# Patient Record
Sex: FEMALE | Race: WHITE | NOT HISPANIC OR LATINO | URBAN - METROPOLITAN AREA
[De-identification: names, ages, dates, MRNs, and addresses within clinical notes are randomized per-mention and may not be internally consistent; named-entity substitution may affect disease eponyms.]

---

## 2018-01-22 ENCOUNTER — EMERGENCY (EMERGENCY)
Facility: HOSPITAL | Age: 38
LOS: 1 days | Discharge: HOME | End: 2018-01-22
Attending: EMERGENCY MEDICINE | Admitting: EMERGENCY MEDICINE
Payer: COMMERCIAL

## 2018-01-22 VITALS
RESPIRATION RATE: 16 BRPM | SYSTOLIC BLOOD PRESSURE: 105 MMHG | HEART RATE: 66 BPM | DIASTOLIC BLOOD PRESSURE: 54 MMHG | TEMPERATURE: 99 F | OXYGEN SATURATION: 98 %

## 2018-01-22 VITALS
SYSTOLIC BLOOD PRESSURE: 109 MMHG | DIASTOLIC BLOOD PRESSURE: 62 MMHG | RESPIRATION RATE: 16 BRPM | TEMPERATURE: 99 F | HEART RATE: 78 BPM | OXYGEN SATURATION: 100 %

## 2018-01-22 DIAGNOSIS — Z98.89 OTHER SPECIFIED POSTPROCEDURAL STATES: Chronic | ICD-10-CM

## 2018-01-22 LAB
ALBUMIN SERPL ELPH-MCNC: 4.4 G/DL — SIGNIFICANT CHANGE UP (ref 3.3–5)
ALP SERPL-CCNC: 42 U/L — SIGNIFICANT CHANGE UP (ref 40–120)
ALT FLD-CCNC: 10 U/L — SIGNIFICANT CHANGE UP (ref 4–33)
APPEARANCE UR: CLEAR — SIGNIFICANT CHANGE UP
AST SERPL-CCNC: 15 U/L — SIGNIFICANT CHANGE UP (ref 4–32)
BACTERIA # UR AUTO: SIGNIFICANT CHANGE UP
BASOPHILS # BLD AUTO: 0.04 K/UL — SIGNIFICANT CHANGE UP (ref 0–0.2)
BASOPHILS NFR BLD AUTO: 0.5 % — SIGNIFICANT CHANGE UP (ref 0–2)
BILIRUB SERPL-MCNC: 0.3 MG/DL — SIGNIFICANT CHANGE UP (ref 0.2–1.2)
BILIRUB UR-MCNC: NEGATIVE — SIGNIFICANT CHANGE UP
BLOOD UR QL VISUAL: NEGATIVE — SIGNIFICANT CHANGE UP
BUN SERPL-MCNC: 10 MG/DL — SIGNIFICANT CHANGE UP (ref 7–23)
CALCIUM SERPL-MCNC: 8.8 MG/DL — SIGNIFICANT CHANGE UP (ref 8.4–10.5)
CHLORIDE SERPL-SCNC: 103 MMOL/L — SIGNIFICANT CHANGE UP (ref 98–107)
CO2 SERPL-SCNC: 21 MMOL/L — LOW (ref 22–31)
COLOR SPEC: SIGNIFICANT CHANGE UP
CREAT SERPL-MCNC: 0.71 MG/DL — SIGNIFICANT CHANGE UP (ref 0.5–1.3)
EOSINOPHIL # BLD AUTO: 0.07 K/UL — SIGNIFICANT CHANGE UP (ref 0–0.5)
EOSINOPHIL NFR BLD AUTO: 0.9 % — SIGNIFICANT CHANGE UP (ref 0–6)
GLUCOSE SERPL-MCNC: 104 MG/DL — HIGH (ref 70–99)
GLUCOSE UR-MCNC: NEGATIVE — SIGNIFICANT CHANGE UP
HCT VFR BLD CALC: 38.5 % — SIGNIFICANT CHANGE UP (ref 34.5–45)
HGB BLD-MCNC: 13.3 G/DL — SIGNIFICANT CHANGE UP (ref 11.5–15.5)
IMM GRANULOCYTES # BLD AUTO: 0.03 # — SIGNIFICANT CHANGE UP
IMM GRANULOCYTES NFR BLD AUTO: 0.4 % — SIGNIFICANT CHANGE UP (ref 0–1.5)
KETONES UR-MCNC: NEGATIVE — SIGNIFICANT CHANGE UP
LEUKOCYTE ESTERASE UR-ACNC: HIGH
LYMPHOCYTES # BLD AUTO: 2.65 K/UL — SIGNIFICANT CHANGE UP (ref 1–3.3)
LYMPHOCYTES # BLD AUTO: 33.5 % — SIGNIFICANT CHANGE UP (ref 13–44)
MCHC RBC-ENTMCNC: 31.4 PG — SIGNIFICANT CHANGE UP (ref 27–34)
MCHC RBC-ENTMCNC: 34.5 % — SIGNIFICANT CHANGE UP (ref 32–36)
MCV RBC AUTO: 90.8 FL — SIGNIFICANT CHANGE UP (ref 80–100)
MONOCYTES # BLD AUTO: 0.52 K/UL — SIGNIFICANT CHANGE UP (ref 0–0.9)
MONOCYTES NFR BLD AUTO: 6.6 % — SIGNIFICANT CHANGE UP (ref 2–14)
NEUTROPHILS # BLD AUTO: 4.61 K/UL — SIGNIFICANT CHANGE UP (ref 1.8–7.4)
NEUTROPHILS NFR BLD AUTO: 58.1 % — SIGNIFICANT CHANGE UP (ref 43–77)
NITRITE UR-MCNC: NEGATIVE — SIGNIFICANT CHANGE UP
NRBC # FLD: 0 — SIGNIFICANT CHANGE UP
PH UR: 7.5 — SIGNIFICANT CHANGE UP (ref 4.6–8)
PLATELET # BLD AUTO: 212 K/UL — SIGNIFICANT CHANGE UP (ref 150–400)
PMV BLD: 9.5 FL — SIGNIFICANT CHANGE UP (ref 7–13)
POTASSIUM SERPL-MCNC: 4 MMOL/L — SIGNIFICANT CHANGE UP (ref 3.5–5.3)
POTASSIUM SERPL-SCNC: 4 MMOL/L — SIGNIFICANT CHANGE UP (ref 3.5–5.3)
PROT SERPL-MCNC: 7 G/DL — SIGNIFICANT CHANGE UP (ref 6–8.3)
PROT UR-MCNC: NEGATIVE MG/DL — SIGNIFICANT CHANGE UP
RBC # BLD: 4.24 M/UL — SIGNIFICANT CHANGE UP (ref 3.8–5.2)
RBC # FLD: 12.3 % — SIGNIFICANT CHANGE UP (ref 10.3–14.5)
RBC CASTS # UR COMP ASSIST: SIGNIFICANT CHANGE UP (ref 0–?)
SODIUM SERPL-SCNC: 137 MMOL/L — SIGNIFICANT CHANGE UP (ref 135–145)
SP GR SPEC: 1.01 — SIGNIFICANT CHANGE UP (ref 1–1.04)
SQUAMOUS # UR AUTO: SIGNIFICANT CHANGE UP
UROBILINOGEN FLD QL: NORMAL MG/DL — SIGNIFICANT CHANGE UP
WBC # BLD: 7.92 K/UL — SIGNIFICANT CHANGE UP (ref 3.8–10.5)
WBC # FLD AUTO: 7.92 K/UL — SIGNIFICANT CHANGE UP (ref 3.8–10.5)
WBC UR QL: SIGNIFICANT CHANGE UP (ref 0–?)

## 2018-01-22 PROCEDURE — 99284 EMERGENCY DEPT VISIT MOD MDM: CPT

## 2018-01-22 PROCEDURE — 71046 X-RAY EXAM CHEST 2 VIEWS: CPT | Mod: 26

## 2018-01-22 NOTE — ED PROVIDER NOTE - CARE PLAN
Principal Discharge DX:	Shortness of breath  Assessment and plan of treatment:	The patient was given verbal and written discharge instructions. Specifically, instructions when to return to the ED and when to seek follow-up from their pcp was discussed. Any specialty follow-up was discussed, including how to make an appointment.  Instructions were discussed in simple, plain language and was understood by the patient. The patient understands that should their symptoms worsen or any new symptoms arise, they should return to the ED immediately for further evaluation.

## 2018-01-22 NOTE — ED PROVIDER NOTE - MEDICAL DECISION MAKING DETAILS
sob, perc negative, no chest pain, will r/o anemia vs. electrolyte disturbance, cxr r/o pneumo or lung mass (given smoking hx).

## 2018-01-22 NOTE — ED PROVIDER NOTE - ATTENDING CONTRIBUTION TO CARE
I performed a face to face evaluation of this patient and performed a full history and physical examination on the patient.  I agree with the resident's history, physical examination, and plan of the patient.  36yo F no significant PMH p/w sob x 5d, initially intermittent, now more frequent, currently no sob, worse with exertion occasionally. Patient went to urgent care and referred to ED for evaluation, provider suggested PE though patient does not know what prompted this concern. Patient also recently worried for pregnancy, multiple neg home pregnancy tests and suprapubic pressure without dysuria  On exam awake & alert, NAD., NC/AT, mmm, lungs CTAB, RRR, abdomen soft NT/ND no rebound no guarding, no CVA tenderness, no edema, no calf tenderness, 2+ pulses b/l, neuro A&Ox3, no focal deficits, skin warm and dry no rash

## 2018-01-22 NOTE — ED PROVIDER NOTE - OBJECTIVE STATEMENT
37yF no pmhx p/w sob x 5d, initially intermittent, now constant, not worse with exertion or position. pt notes it most after lunch when walking back to work. No recent cough, fever, chest pain. Pt also has suprapubic cramping pain x 2wks. No dysuria. Has identical symptoms when pregnant. Pt was convinced she was pregnant. Tearful on exam (after being informed she is not pregnant).

## 2018-01-22 NOTE — ED ADULT TRIAGE NOTE - CHIEF COMPLAINT QUOTE
Pt complaining of SOB x5 days worsening, respirations even and unlabored, able to speak in complete sentences. Pt denies chest pain, N/V/D, fever or chills.

## 2019-09-11 ENCOUNTER — RESULT REVIEW (OUTPATIENT)
Age: 39
End: 2019-09-11

## 2019-10-24 ENCOUNTER — APPOINTMENT (OUTPATIENT)
Dept: ANTEPARTUM | Facility: CLINIC | Age: 39
End: 2019-10-24
Payer: COMMERCIAL

## 2019-10-24 ENCOUNTER — ASOB RESULT (OUTPATIENT)
Age: 39
End: 2019-10-24

## 2019-10-24 PROCEDURE — 76813 OB US NUCHAL MEAS 1 GEST: CPT

## 2019-10-24 PROCEDURE — 99241 OFFICE CONSULTATION NEW/ESTAB PATIENT 15 MIN: CPT | Mod: 25

## 2019-10-24 PROCEDURE — 36416 COLLJ CAPILLARY BLOOD SPEC: CPT

## 2019-10-24 PROCEDURE — 36415 COLL VENOUS BLD VENIPUNCTURE: CPT

## 2019-10-24 PROCEDURE — 76801 OB US < 14 WKS SINGLE FETUS: CPT

## 2019-12-23 ENCOUNTER — APPOINTMENT (OUTPATIENT)
Dept: ANTEPARTUM | Facility: CLINIC | Age: 39
End: 2019-12-23
Payer: COMMERCIAL

## 2019-12-23 ENCOUNTER — ASOB RESULT (OUTPATIENT)
Age: 39
End: 2019-12-23

## 2019-12-23 PROCEDURE — 76811 OB US DETAILED SNGL FETUS: CPT

## 2020-01-09 ENCOUNTER — OUTPATIENT (OUTPATIENT)
Dept: INPATIENT UNIT | Facility: HOSPITAL | Age: 40
LOS: 1 days | Discharge: HOME | End: 2020-01-09
Payer: COMMERCIAL

## 2020-01-09 VITALS — DIASTOLIC BLOOD PRESSURE: 49 MMHG | SYSTOLIC BLOOD PRESSURE: 87 MMHG | HEART RATE: 64 BPM

## 2020-01-09 VITALS
HEART RATE: 67 BPM | SYSTOLIC BLOOD PRESSURE: 102 MMHG | DIASTOLIC BLOOD PRESSURE: 53 MMHG | TEMPERATURE: 98 F | RESPIRATION RATE: 14 BRPM

## 2020-01-09 DIAGNOSIS — Z3A.00 WEEKS OF GESTATION OF PREGNANCY NOT SPECIFIED: ICD-10-CM

## 2020-01-09 DIAGNOSIS — Z98.89 OTHER SPECIFIED POSTPROCEDURAL STATES: Chronic | ICD-10-CM

## 2020-01-09 DIAGNOSIS — G56.01 CARPAL TUNNEL SYNDROME, RIGHT UPPER LIMB: Chronic | ICD-10-CM

## 2020-01-09 DIAGNOSIS — O26.899 OTHER SPECIFIED PREGNANCY RELATED CONDITIONS, UNSPECIFIED TRIMESTER: ICD-10-CM

## 2020-01-09 PROCEDURE — 76817 TRANSVAGINAL US OBSTETRIC: CPT | Mod: 26

## 2020-01-09 PROCEDURE — 99215 OFFICE O/P EST HI 40 MIN: CPT

## 2020-01-09 PROCEDURE — 76815 OB US LIMITED FETUS(S): CPT | Mod: 26

## 2020-01-09 PROCEDURE — 99215 OFFICE O/P EST HI 40 MIN: CPT | Mod: 25

## 2020-01-09 RX ORDER — INDOMETHACIN 50 MG
50 CAPSULE ORAL ONCE
Refills: 0 | Status: COMPLETED | OUTPATIENT
Start: 2020-01-09 | End: 2020-01-09

## 2020-01-09 RX ORDER — SODIUM CHLORIDE 9 MG/ML
500 INJECTION, SOLUTION INTRAVENOUS ONCE
Refills: 0 | Status: COMPLETED | OUTPATIENT
Start: 2020-01-09 | End: 2020-01-09

## 2020-01-09 RX ADMIN — SODIUM CHLORIDE 1500 MILLILITER(S): 9 INJECTION, SOLUTION INTRAVENOUS at 19:35

## 2020-01-09 RX ADMIN — Medication 50 MILLIGRAM(S): at 19:44

## 2020-01-09 NOTE — OB PROVIDER TRIAGE NOTE - NSHPPHYSICALEXAM_GEN_ALL_CORE
Gen: A&O x 3; appears very anxious  Vitals-BP-96/55; P-68    Abd exam-soft and nontender gravid uterus    TAS-footling breech; AAFI with MVP of 6.56; 587g; SLIUP; posterior intact placenta  SSE-approx 2 cc of brownish blood in the posterior vault that was cleaned out. Pt valsalva and no more active bleeding from os. +polyp seen emerging from os approx the size of a pea  TVS-3.15-3.24 cm no funnel/dynamic change. No blood in lower uterine segment seen on TVS

## 2020-01-09 NOTE — OB PROVIDER TRIAGE NOTE - NSOBPROVIDERNOTE_OBGYN_ALL_OB_FT
38yo  female  @ 23.0 wks SLIUP here with vaginal polyp due to cerical polyp  -no active polyp  -cervical length is long and reassuring; TAS reveals a reassuring fetal status 38yo  female  @ 23.0 wks SLIUP here with vaginal polyp due to cerical polyp  -no active polyp  -cervical length is long and reassuring; TAS reveals a reassuring fetal status  -toco with irreg ctx's. Pt was dw Dr Wood. IVH and rpt cervical length in 2 hours 40yo  female  @ 23.0 wks SLIUP here with vaginal polyp due to cerical polyp  -no active polyp  -cervical length is long and reassuring; TAS reveals a reassuring fetal status  -toco with irreg ctx's. Pt was dw Dr Wood. IVH and rpt cervical length in 2 hours. Indocin given 38yo  female  @ 23.0 wks SLIUP here with vaginal polyp due to cerical polyp  -no active polyp  -cervical length is long and reassuring; TAS reveals a reassuring fetal status  -toco with irreg ctx's. Pt was dw Dr Wood. IVH and rpt cervical length in 2 hours. Indocin given    Received report from AMARA PUGA.   Pt. received 50mg of Indocin at 1945  Dark dried blood noted on labia  Repeat CL: 3.0-3.3 no funneling or dynamic changes noted  Contractions have spaced out to one contraction an hour    Discussed findings with Dr. Wood. Pt. d/c'd home. Pt. to follow up with next OB appointment. Pt. instructed to follow pelvic rest and return to triage with contractions, LOF, or vaginal bleeding.

## 2020-01-09 NOTE — OB PROVIDER TRIAGE NOTE - HISTORY OF PRESENT ILLNESS
40yo  female  @ 23.0 wks SLIUP here co sudden onset of vaginal bleeding that started at approx 4:10pm with passage of small clot. Pt denies any ctx's or cramping. No hx LLP or previa. No recent sexual intercourse.    Pmhx-anxiety  Pshx/Arqf-s-rkysnmg; carpal tunnel release  Meds-PNV  Past hi-y-zjqmwqe for FTD  Gyn-denies 40yo  female  @ 23.0 wks SLIUP here co sudden onset of vaginal bleeding that started at approx 4:10pm with passage of small clot. Pt denies any ctx's or cramping. No hx LLP or previa. No recent sexual intercourse. Pt with occasional abd tightening.    Pmhx-anxiety  Pshx/Qqxr-g-tvbkalu; carpal tunnel release  Meds-PNV  Past mb-a-gtqizza for FTD  Gyn-denies

## 2020-01-09 NOTE — OB PROVIDER TRIAGE NOTE - ADDITIONAL INSTRUCTIONS
Discussed findings with Dr. Wood. Pt. d/c'd home. Pt. to follow up with next OB appointment. Pt. instructed to follow pelvic rest and return to triage with contractions, LOF, or vaginal bleeding.

## 2020-01-29 ENCOUNTER — OUTPATIENT (OUTPATIENT)
Dept: OUTPATIENT SERVICES | Facility: HOSPITAL | Age: 40
LOS: 1 days | Discharge: HOME | End: 2020-01-29
Payer: COMMERCIAL

## 2020-01-29 VITALS
RESPIRATION RATE: 17 BRPM | HEART RATE: 79 BPM | SYSTOLIC BLOOD PRESSURE: 104 MMHG | TEMPERATURE: 98 F | DIASTOLIC BLOOD PRESSURE: 55 MMHG

## 2020-01-29 VITALS — SYSTOLIC BLOOD PRESSURE: 119 MMHG | DIASTOLIC BLOOD PRESSURE: 57 MMHG | HEART RATE: 77 BPM

## 2020-01-29 DIAGNOSIS — Z98.89 OTHER SPECIFIED POSTPROCEDURAL STATES: Chronic | ICD-10-CM

## 2020-01-29 DIAGNOSIS — O26.899 OTHER SPECIFIED PREGNANCY RELATED CONDITIONS, UNSPECIFIED TRIMESTER: ICD-10-CM

## 2020-01-29 DIAGNOSIS — G56.01 CARPAL TUNNEL SYNDROME, RIGHT UPPER LIMB: Chronic | ICD-10-CM

## 2020-01-29 DIAGNOSIS — Z3A.00 WEEKS OF GESTATION OF PREGNANCY NOT SPECIFIED: ICD-10-CM

## 2020-01-29 LAB
APPEARANCE UR: CLEAR — SIGNIFICANT CHANGE UP
BILIRUB UR-MCNC: NEGATIVE — SIGNIFICANT CHANGE UP
BLOOD UR QL VISUAL: NEGATIVE — SIGNIFICANT CHANGE UP
COLOR SPEC: SIGNIFICANT CHANGE UP
GLUCOSE UR-MCNC: NEGATIVE — SIGNIFICANT CHANGE UP
KETONES UR-MCNC: NEGATIVE — SIGNIFICANT CHANGE UP
LEUKOCYTE ESTERASE UR-ACNC: NEGATIVE — SIGNIFICANT CHANGE UP
NITRITE UR-MCNC: NEGATIVE — SIGNIFICANT CHANGE UP
PH UR: 6.5 — SIGNIFICANT CHANGE UP (ref 5–8)
PROT UR-MCNC: 10 — SIGNIFICANT CHANGE UP
SP GR SPEC: 1.02 — SIGNIFICANT CHANGE UP (ref 1–1.04)
UROBILINOGEN FLD QL: NORMAL — SIGNIFICANT CHANGE UP

## 2020-01-29 PROCEDURE — 59025 FETAL NON-STRESS TEST: CPT | Mod: 26

## 2020-01-29 RX ORDER — ACETAMINOPHEN 500 MG
975 TABLET ORAL ONCE
Refills: 0 | Status: COMPLETED | OUTPATIENT
Start: 2020-01-29 | End: 2020-01-29

## 2020-01-29 RX ADMIN — Medication 975 MILLIGRAM(S): at 22:16

## 2020-01-29 RX ADMIN — Medication 975 MILLIGRAM(S): at 21:37
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
## 2020-01-29 NOTE — OB PROVIDER TRIAGE NOTE - ADDITIONAL INSTRUCTIONS
Follow up at office on Friday 1/31/20   Return for decreased fetal movement, loss of fluid or vaginal bleeding  Increase oral hydration  Kick counts reviewed

## 2020-01-29 NOTE — OB PROVIDER TRIAGE NOTE - NSHPLABSRESULTS_GEN_ALL_CORE
Urinalysis Basic - ( 2020 21:00 )    Color: LIGHT YELLOW / Appearance: CLEAR / S.020 / pH: 6.5  Gluc: NEGATIVE / Ketone: NEGATIVE  / Bili: NEGATIVE / Urobili: NORMAL   Blood: NEGATIVE / Protein: 10 / Nitrite: NEGATIVE   Leuk Esterase: NEGATIVE / RBC: x / WBC x   Sq Epi: x / Non Sq Epi: x / Bacteria: x

## 2020-01-29 NOTE — OB PROVIDER TRIAGE NOTE - NS_AFI_OBGYN_ALL_OB_FT
NURSING ADMISSION NOTE      Patient admitted via direct admission. Oriented to room. Safety precautions initiated. Bed in low position. Call light in reach. 20

## 2020-01-29 NOTE — OB PROVIDER TRIAGE NOTE - NSHPPHYSICALEXAM_GEN_ALL_CORE
Vital Signs Last 24 Hrs  T(C): 36.8 (29 Jan 2020 19:56), Max: 36.8 (29 Jan 2020 19:56)  T(F): 98.2 (29 Jan 2020 19:56), Max: 98.2 (29 Jan 2020 19:56)  HR: 79 (29 Jan 2020 20:05) (79 - 79)  BP: 104/55 (29 Jan 2020 20:05) (104/55 - 104/55)  RR: 17 (29 Jan 2020 19:56) (17 - 17)    Assessment reveals VSS  Abdomen soft, NT, gravid  Cat 1 tracing, no ctx on toco  Transabdominal- BPP8/8, EFW 839gm, ant placenta, GALILEO:20, transverse   Transvaginal- cervical length 4.0-4.2, no funneling or dynamic changes

## 2020-01-29 NOTE — OB RN TRIAGE NOTE - NS_FETALMOVEMENTDETAILS_OBGYN_ALL_OB_FT
as per the patient not feeling baby moving as usual for last 3 days, last one kick I felt today was at 11am

## 2020-01-29 NOTE — OB PROVIDER TRIAGE NOTE - NS_OBGYNHISTORY_OBGYN_ALL_OB_FT
GYN: cervical polyp  OB: C/S 5/29/2014, 8#3, arrest, male, uncomplicated      AP course uncomplicated at this time GYN: cervical polyp- on pelvic rest, dx in triage 3 weeks ago  OB: C/S 5/29/2014, 8#3, arrest, male, uncomplicated      AP course uncomplicated at this time

## 2020-01-29 NOTE — OB PROVIDER TRIAGE NOTE - PLAN OF CARE
D/C Home  D/W Dr. Elmore  Normal Fetal Testing  No evidence of acute process at this time  Follow up at office on Friday 1/31/20   Return for decreased fetal movement, loss of fluid or vaginal bleeding  Increase oral hydration  Kick counts reviewed

## 2020-01-30 PROBLEM — O03.9 COMPLETE OR UNSPECIFIED SPONTANEOUS ABORTION WITHOUT COMPLICATION: Chronic | Status: ACTIVE | Noted: 2020-01-09

## 2020-03-03 ENCOUNTER — INPATIENT (INPATIENT)
Facility: HOSPITAL | Age: 40
LOS: 2 days | Discharge: HOME | End: 2020-03-06
Attending: OBSTETRICS & GYNECOLOGY | Admitting: OBSTETRICS & GYNECOLOGY

## 2020-03-03 VITALS
HEART RATE: 80 BPM | RESPIRATION RATE: 18 BRPM | SYSTOLIC BLOOD PRESSURE: 100 MMHG | TEMPERATURE: 98 F | DIASTOLIC BLOOD PRESSURE: 59 MMHG

## 2020-03-03 DIAGNOSIS — G56.00 CARPAL TUNNEL SYNDROME, UNSPECIFIED UPPER LIMB: Chronic | ICD-10-CM

## 2020-03-03 DIAGNOSIS — O26.899 OTHER SPECIFIED PREGNANCY RELATED CONDITIONS, UNSPECIFIED TRIMESTER: ICD-10-CM

## 2020-03-03 DIAGNOSIS — Z3A.00 WEEKS OF GESTATION OF PREGNANCY NOT SPECIFIED: ICD-10-CM

## 2020-03-03 DIAGNOSIS — Z98.89 OTHER SPECIFIED POSTPROCEDURAL STATES: Chronic | ICD-10-CM

## 2020-03-03 DIAGNOSIS — O60.03 PRETERM LABOR WITHOUT DELIVERY, THIRD TRIMESTER: ICD-10-CM

## 2020-03-03 LAB
APPEARANCE UR: CLEAR — SIGNIFICANT CHANGE UP
BASOPHILS # BLD AUTO: 0.04 K/UL — SIGNIFICANT CHANGE UP (ref 0–0.2)
BASOPHILS NFR BLD AUTO: 0.3 % — SIGNIFICANT CHANGE UP (ref 0–2)
BILIRUB UR-MCNC: NEGATIVE — SIGNIFICANT CHANGE UP
BLD GP AB SCN SERPL QL: NEGATIVE — SIGNIFICANT CHANGE UP
BLOOD UR QL VISUAL: NEGATIVE — SIGNIFICANT CHANGE UP
COLOR SPEC: COLORLESS — SIGNIFICANT CHANGE UP
EOSINOPHIL # BLD AUTO: 0.12 K/UL — SIGNIFICANT CHANGE UP (ref 0–0.5)
EOSINOPHIL NFR BLD AUTO: 0.9 % — SIGNIFICANT CHANGE UP (ref 0–6)
FLECAINIDE SERPL-MCNC: POSITIVE — SIGNIFICANT CHANGE UP
GLUCOSE UR-MCNC: NEGATIVE — SIGNIFICANT CHANGE UP
HCT VFR BLD CALC: 31.1 % — LOW (ref 34.5–45)
HGB BLD-MCNC: 10.5 G/DL — LOW (ref 11.5–15.5)
IMM GRANULOCYTES NFR BLD AUTO: 1.3 % — SIGNIFICANT CHANGE UP (ref 0–1.5)
KETONES UR-MCNC: NEGATIVE — SIGNIFICANT CHANGE UP
LEUKOCYTE ESTERASE UR-ACNC: NEGATIVE — SIGNIFICANT CHANGE UP
LYMPHOCYTES # BLD AUTO: 17.2 % — SIGNIFICANT CHANGE UP (ref 13–44)
LYMPHOCYTES # BLD AUTO: 2.18 K/UL — SIGNIFICANT CHANGE UP (ref 1–3.3)
MCHC RBC-ENTMCNC: 30.6 PG — SIGNIFICANT CHANGE UP (ref 27–34)
MCHC RBC-ENTMCNC: 33.8 % — SIGNIFICANT CHANGE UP (ref 32–36)
MCV RBC AUTO: 90.7 FL — SIGNIFICANT CHANGE UP (ref 80–100)
MONOCYTES # BLD AUTO: 0.8 K/UL — SIGNIFICANT CHANGE UP (ref 0–0.9)
MONOCYTES NFR BLD AUTO: 6.3 % — SIGNIFICANT CHANGE UP (ref 2–14)
NEUTROPHILS # BLD AUTO: 9.36 K/UL — HIGH (ref 1.8–7.4)
NEUTROPHILS NFR BLD AUTO: 74 % — SIGNIFICANT CHANGE UP (ref 43–77)
NITRITE UR-MCNC: NEGATIVE — SIGNIFICANT CHANGE UP
NRBC # FLD: 0 K/UL — SIGNIFICANT CHANGE UP (ref 0–0)
PH UR: 6.5 — SIGNIFICANT CHANGE UP (ref 5–8)
PLATELET # BLD AUTO: 200 K/UL — SIGNIFICANT CHANGE UP (ref 150–400)
PMV BLD: 10 FL — SIGNIFICANT CHANGE UP (ref 7–13)
PROT UR-MCNC: NEGATIVE — SIGNIFICANT CHANGE UP
RBC # BLD: 3.43 M/UL — LOW (ref 3.8–5.2)
RBC # FLD: 13.2 % — SIGNIFICANT CHANGE UP (ref 10.3–14.5)
RH IG SCN BLD-IMP: POSITIVE — SIGNIFICANT CHANGE UP
SP GR SPEC: 1.01 — SIGNIFICANT CHANGE UP (ref 1–1.04)
UROBILINOGEN FLD QL: NORMAL — SIGNIFICANT CHANGE UP
WBC # BLD: 12.67 K/UL — HIGH (ref 3.8–10.5)
WBC # FLD AUTO: 12.67 K/UL — HIGH (ref 3.8–10.5)

## 2020-03-03 RX ORDER — SODIUM CHLORIDE 9 MG/ML
1000 INJECTION, SOLUTION INTRAVENOUS
Refills: 0 | Status: COMPLETED | OUTPATIENT
Start: 2020-03-03 | End: 2020-03-03

## 2020-03-03 RX ORDER — SENNA PLUS 8.6 MG/1
2 TABLET ORAL AT BEDTIME
Refills: 0 | Status: DISCONTINUED | OUTPATIENT
Start: 2020-03-03 | End: 2020-03-06

## 2020-03-03 RX ORDER — MAGNESIUM SULFATE 500 MG/ML
4 VIAL (ML) INJECTION ONCE
Refills: 0 | Status: COMPLETED | OUTPATIENT
Start: 2020-03-03 | End: 2020-03-03

## 2020-03-03 RX ORDER — SODIUM CHLORIDE 9 MG/ML
1000 INJECTION, SOLUTION INTRAVENOUS ONCE
Refills: 0 | Status: COMPLETED | OUTPATIENT
Start: 2020-03-03 | End: 2020-03-03

## 2020-03-03 RX ORDER — MAGNESIUM SULFATE 500 MG/ML
2 VIAL (ML) INJECTION
Qty: 40 | Refills: 0 | Status: DISCONTINUED | OUTPATIENT
Start: 2020-03-03 | End: 2020-03-04

## 2020-03-03 RX ORDER — AMPICILLIN TRIHYDRATE 250 MG
2 CAPSULE ORAL ONCE
Refills: 0 | Status: COMPLETED | OUTPATIENT
Start: 2020-03-03 | End: 2020-03-03

## 2020-03-03 RX ORDER — AMPICILLIN TRIHYDRATE 250 MG
1 CAPSULE ORAL EVERY 4 HOURS
Refills: 0 | Status: DISCONTINUED | OUTPATIENT
Start: 2020-03-03 | End: 2020-03-04

## 2020-03-03 RX ADMIN — Medication 50 GM/HR: at 21:45

## 2020-03-03 RX ADMIN — Medication 12 MILLIGRAM(S): at 22:00

## 2020-03-03 RX ADMIN — SODIUM CHLORIDE 2000 MILLILITER(S): 9 INJECTION, SOLUTION INTRAVENOUS at 21:46

## 2020-03-03 RX ADMIN — Medication 216 GRAM(S): at 22:23

## 2020-03-03 RX ADMIN — SODIUM CHLORIDE 50 MILLILITER(S): 9 INJECTION, SOLUTION INTRAVENOUS at 22:20

## 2020-03-03 RX ADMIN — Medication 300 GRAM(S): at 21:23

## 2020-03-03 NOTE — OB PROVIDER H&P - NSHPLABSRESULTS_GEN_ALL_CORE
bld type: O+    +FFN    Urinalysis Basic - ( 03 Mar 2020 18:53 )    Color: COLORLESS / Appearance: CLEAR / S.006 / pH: 6.5  Gluc: NEGATIVE / Ketone: NEGATIVE  / Bili: NEGATIVE / Urobili: NORMAL   Blood: NEGATIVE / Protein: NEGATIVE / Nitrite: NEGATIVE   Leuk Esterase: NEGATIVE / RBC: x / WBC x   Sq Epi: x / Non Sq Epi: x / Bacteria: x

## 2020-03-03 NOTE — OB PROVIDER TRIAGE NOTE - NSHPLABSRESULTS_GEN_ALL_CORE
O+ bld type: O+    +FFN    Urinalysis Basic - ( 03 Mar 2020 18:53 )    Color: COLORLESS / Appearance: CLEAR / S.006 / pH: 6.5  Gluc: NEGATIVE / Ketone: NEGATIVE  / Bili: NEGATIVE / Urobili: NORMAL   Blood: NEGATIVE / Protein: NEGATIVE / Nitrite: NEGATIVE   Leuk Esterase: NEGATIVE / RBC: x / WBC x   Sq Epi: x / Non Sq Epi: x / Bacteria: x
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
## 2020-03-03 NOTE — OB PROVIDER TRIAGE NOTE - NS_CTXBYPALP_OBGYN_ALL_OB
January 10, 2019      [place]  [address]  [facility] [address]           [facility]  [address] [facility] [address] [place] [address]  Phone: [phone]          Patient: Mandi Cline   MR Number: [id number]   YOB: 1954   Date of Visit: 1/7/2019       Dear Pamella Heard:    Thank you for referring Mandi Cline to me for evaluation. Attached you will find relevant portions of my assessment and plan of care.    If you have questions, please do not hesitate to call me. I look forward to following Mandi Cline along with you.    Sincerely,    Zach Burch, DPMAHENDRA    Enclosure  CC:  No Recipients    If you would like to receive this communication electronically, please contact [email or web address] or [phone] to request more information on [employer] Link access.    For providers and/or their staff who would like to refer a patient to [facility], please contact us through our one-stop-shop provider referral line, [facility] , at [phone].    If you feel you have received this communication in error or would no longer like to receive these types of communications, please e-mail [email or web address]          Mild

## 2020-03-03 NOTE — OB RN TRIAGE NOTE - NSTOBACCO QUIT READY_GEN_A_CORE_SD
thinking about quitting states she stopped a few montha ago approx 4 mos/thinking about quitting states she stopped a few months ago approx 4 mos/thinking about quitting

## 2020-03-03 NOTE — OB RN TRIAGE NOTE - CHIEF COMPLAINT QUOTE
brownish discharge and abdominal tightening every day brownish discharge x 3 days with abdominal  tightening

## 2020-03-03 NOTE — OB RN TRIAGE NOTE - PSH
Acute carpal tunnel syndrome of right wrist  surgery  S/P   13 M 8#3, arrest of descent Carpal tunnel syndrome  both wrist, surgery R  S/P   13 M 8#3, arrest of descent

## 2020-03-03 NOTE — OB PROVIDER H&P - PROBLEM SELECTOR PLAN 1
admit for PTL @ 30.5 wks   for Betamethasone 12mg IM x 2 doses  Ampicillin to cover unknown GBS  Magnesium bolus and continuous infusion  1 liter LR bolus for contractions  GBS sent  monitor LS and DTR's

## 2020-03-03 NOTE — OB PROVIDER TRIAGE NOTE - NSHPPHYSICALEXAM_GEN_ALL_CORE
Gen: A&O x 3; NAD  Vitals: BP-97/51; P-77; T-36.9    Pulm-CTA B/L  Cor-clear S1S2  Abd exam-soft and nontender gravid  SSE-os appears closed; FFN and GBS collected. No blood in vault  TVS-3.22 with dynamic changes to 2.19 cm with small funnel Gen: A&O x 3; NAD  Vitals: BP-97/51; P-77; T-36.9    Pulm-CTA B/L  Cor-clear S1S2  Abd exam-soft and nontender gravid  SSE-os appears closed; FFN and GBS collected. No blood in vault  TVS-3.22 with dynamic changes to 2.19 cm with small funnel    Ab NP  TAS:  anterior  vertex  BPP 8/8  GALILEO: 15.37  EFW: 1857g/4#2

## 2020-03-03 NOTE — OB PROVIDER H&P - ASSESSMENT
41yo Cac[place] female  @ 30.5 wks SLIUP here co abd tightning x 3-4 days with brownish dc x 2 days  -TVS at 2.19 after suprapubic pressure; was 3.15 on 1/9  -FFN and UA sent  -NST cat I with accels and mod variability; irreg ctx's on toco    report received from Celine Berger NP  TAS performed  awaiting FFN results  pt given pitcher of water has she hasn't had water since arrival- pt reports feeling abd cramps that just started- reports felt 2 or 3 contractions since on monitor.  emotional support provided as pt appears to be very anxious  pt corrina, + FFN, Dr Benitez called and made aware    admit for PTL @ 30.5 wks   for Betamethasone 12mg IM x 2 doses  Ampicillin to cover unknown GBS  Magnesium bolus and continuous infusion  1 liter LR bolus for contractions  GBS sent  monitor LS and DTR's   -----------
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
## 2020-03-03 NOTE — OB PROVIDER TRIAGE NOTE - NSOBPROVIDERNOTE_OBGYN_ALL_OB_FT
41yo Cacasian female  @ 30.5 wks SLIUP here co abd tightning x 3-4 days with brownish dc x 2 days  -TVS at 2.19 after suprapubic pressure  -FFN and UA sent  -NST cat I with accels and mod variability; irreg ctx's on toco  ----------- 39yo Cacasian female  @ 30.5 wks SLIUP here co abd tightning x 3-4 days with brownish dc x 2 days  -TVS at 2.19 after suprapubic pressure; was 3.15 on 1/9  -FFN and UA sent  -NST cat I with accels and mod variability; irreg ctx's on toco  ----------- 39yo Cacasian female  @ 30.5 wks SLIUP here co abd tightning x 3-4 days with brownish dc x 2 days  -TVS at 2.19 after suprapubic pressure; was 3.15 on 1/9  -FFN and UA sent  -NST cat I with accels and mod variability; irreg ctx's on toco    report received from Celine PUGA  TAS performed  awaiting FFN results  pt given pitcher of water has she hasn't had water since arrival- pt reports feeling abd cramps that just started- reports felt 2 or 3 contractions since on monitor.  emotional support provided as pt appears to be very anxious  ----------- 39yo Cacasian female  @ 30.5 wks SLIUP here co abd tightning x 3-4 days with brownish dc x 2 days  -TVS at 2.19 after suprapubic pressure; was 3.15 on 1/9  -FFN and UA sent  -NST cat I with accels and mod variability; irreg ctx's on toco    report received from Celine Berger NP  TAS performed  awaiting FFN results  pt given pitcher of water has she hasn't had water since arrival- pt reports feeling abd cramps that just started- reports felt 2 or 3 contractions since on monitor.  emotional support provided as pt appears to be very anxious  pt corrina, + FFN, Dr Benitez called and made aware    admit for PTL @ 30.5 wks   for Betamethasone 12mg IM x 2 doses  Ampicillin to cover unknown GBS  Magnesium bolus and continuous infusion  1 liter LR bolus for contractions  GBS sent  monitor LS and DTR's   -----------

## 2020-03-03 NOTE — OB PROVIDER H&P - NSHPPHYSICALEXAM_GEN_ALL_CORE
Gen: A&O x 3; NAD  Vitals: BP-97/51; P-77; T-36.9    Pulm-CTA B/L  Cor-clear S1S2  Abd exam-soft and nontender gravid  SSE-os appears closed; FFN and GBS collected. No blood in vault  TVS-3.22 with dynamic changes to 2.19 cm with small funnel    Ab NP  TAS:  anterior  vertex  BPP 8/8  GALILEO: 15.37  EFW: 1857g/4#2

## 2020-03-03 NOTE — OB PROVIDER TRIAGE NOTE - NS_AMNIOTICEVAL_OBGYN_ALL_OB
"Pepper Chacon   Future Office Visit:      " Not Done

## 2020-03-03 NOTE — OB RN TRIAGE NOTE - NSSUBSTANCEUSE_GEN_ALL_CORE_SD
street drug/inhalant/medication abuse/other street drug/inhalant/medication abuse/hydrocodone, gabapentine,tramadol

## 2020-03-04 ENCOUNTER — APPOINTMENT (OUTPATIENT)
Dept: ANTEPARTUM | Facility: HOSPITAL | Age: 40
End: 2020-03-04
Payer: COMMERCIAL

## 2020-03-04 ENCOUNTER — OUTPATIENT (OUTPATIENT)
Dept: OUTPATIENT SERVICES | Facility: HOSPITAL | Age: 40
LOS: 1 days | End: 2020-03-04

## 2020-03-04 ENCOUNTER — ASOB RESULT (OUTPATIENT)
Age: 40
End: 2020-03-04

## 2020-03-04 DIAGNOSIS — Z98.89 OTHER SPECIFIED POSTPROCEDURAL STATES: Chronic | ICD-10-CM

## 2020-03-04 DIAGNOSIS — G56.00 CARPAL TUNNEL SYNDROME, UNSPECIFIED UPPER LIMB: Chronic | ICD-10-CM

## 2020-03-04 LAB
MAGNESIUM SERPL-MCNC: 4.7 MG/DL — HIGH (ref 1.6–2.6)
MAGNESIUM SERPL-MCNC: 5.6 MG/DL — HIGH (ref 1.6–2.6)
T PALLIDUM AB TITR SER: NEGATIVE — SIGNIFICANT CHANGE UP

## 2020-03-04 PROCEDURE — 76817 TRANSVAGINAL US OBSTETRIC: CPT | Mod: 26

## 2020-03-04 PROCEDURE — 76819 FETAL BIOPHYS PROFIL W/O NST: CPT | Mod: 26

## 2020-03-04 PROCEDURE — 99231 SBSQ HOSP IP/OBS SF/LOW 25: CPT | Mod: 25

## 2020-03-04 PROCEDURE — 76816 OB US FOLLOW-UP PER FETUS: CPT | Mod: 26

## 2020-03-04 RX ORDER — SIMETHICONE 80 MG/1
80 TABLET, CHEWABLE ORAL
Refills: 0 | Status: DISCONTINUED | OUTPATIENT
Start: 2020-03-04 | End: 2020-03-06

## 2020-03-04 RX ORDER — SODIUM CHLORIDE 9 MG/ML
1000 INJECTION, SOLUTION INTRAVENOUS
Refills: 0 | Status: DISCONTINUED | OUTPATIENT
Start: 2020-03-04 | End: 2020-03-04

## 2020-03-04 RX ORDER — BUPRENORPHINE 10 UG/H
3 PATCH, EXTENDED RELEASE TRANSDERMAL
Qty: 0 | Refills: 0 | DISCHARGE

## 2020-03-04 RX ORDER — BUPRENORPHINE 10 UG/H
1 PATCH, EXTENDED RELEASE TRANSDERMAL
Refills: 0 | Status: DISCONTINUED | OUTPATIENT
Start: 2020-03-04 | End: 2020-03-06

## 2020-03-04 RX ADMIN — Medication 12 MILLIGRAM(S): at 23:03

## 2020-03-04 RX ADMIN — Medication 108 GRAM(S): at 06:30

## 2020-03-04 RX ADMIN — Medication 50 GM/HR: at 13:35

## 2020-03-04 RX ADMIN — Medication 1 TABLET(S): at 23:02

## 2020-03-04 RX ADMIN — Medication 108 GRAM(S): at 14:10

## 2020-03-04 RX ADMIN — BUPRENORPHINE 1 MILLIGRAM(S): 10 PATCH, EXTENDED RELEASE TRANSDERMAL at 06:28

## 2020-03-04 RX ADMIN — BUPRENORPHINE 1 MILLIGRAM(S): 10 PATCH, EXTENDED RELEASE TRANSDERMAL at 07:00

## 2020-03-04 RX ADMIN — SIMETHICONE 80 MILLIGRAM(S): 80 TABLET, CHEWABLE ORAL at 13:34

## 2020-03-04 RX ADMIN — Medication 108 GRAM(S): at 10:10

## 2020-03-04 RX ADMIN — Medication 108 GRAM(S): at 02:11

## 2020-03-04 RX ADMIN — Medication 1 TABLET(S): at 00:05

## 2020-03-04 RX ADMIN — SODIUM CHLORIDE 75 MILLILITER(S): 9 INJECTION, SOLUTION INTRAVENOUS at 13:35

## 2020-03-04 NOTE — PROGRESS NOTE ADULT - PROBLEM SELECTOR PLAN 1
Neuro: No analgesics required   CV: Hemodynamically stable  Pulm: O2 sat WNL on RA  GI: Regular diet   : Voiding spontaneously  Heme: DVT ppx: Lovenox, SCDs while in bed  ID: Afebrile, No signs of infection  Fetal Well Being: Continuous monitoring, s/p BMZ#1 to receive #2 tonight. Continues on magnesium sulfate for neuroprotection and ampicillin. For ATU sono today.   Dispo: Continue to closely monitor for PTL, continue magnesium, BMZ and ampicillin.     MARQUITA Mckeon, PGY-3

## 2020-03-04 NOTE — PROGRESS NOTE ADULT - SUBJECTIVE AND OBJECTIVE BOX
R3 OB Note     Patient seen and examined at bedside, no acute overnight events. No acute complaints, denies any contractions or discomfort.     Pt reports +FM. Denies LOF, VB, ctx, HA, epigastric pain, blurred vision, CP, SOB, N/V, fevers, and chills.    Vital Signs Last 24 Hours  T(C): 36.8 (03-04-20 @ 00:13), Max: 36.9 (03-03-20 @ 18:00)  HR: 78 (03-04-20 @ 04:30) (66 - 87)  BP: 94/55 (03-04-20 @ 04:23) (83/44 - 121/58)  RR: 16 (03-04-20 @ 00:13) (16 - 18)  SpO2: 94% (03-04-20 @ 04:27) (93% - 100%)    Physical Exam:  General: NAD  CV: RRR  Pulm: CTAB  Abdomen: Soft, non-tender, gravid  Ext: No pain or swelling; DTR 2+ b/l     Labs:             10.5   12.67 )-----------( 200      ( 03-03 @ 20:30 )             31.1         MEDICATIONS  (STANDING):  ampicillin  IVPB 1 Gram(s) IV Intermittent every 4 hours  betamethasone Injectable 12 milliGRAM(s) IntraMuscular every 24 hours  buprenorphine 1 milliGRAM(s) SubLingual <User Schedule>  magnesium sulfate Infusion 2 Gm/Hr (50 mL/Hr) IV Continuous <Continuous>  prenatal multivitamin 1 Tablet(s) Oral daily  senna 2 Tablet(s) Oral at bedtime    MEDICATIONS  (PRN):

## 2020-03-05 DIAGNOSIS — O60.00 PRETERM LABOR WITHOUT DELIVERY, UNSPECIFIED TRIMESTER: ICD-10-CM

## 2020-03-05 RX ORDER — ACETAMINOPHEN 500 MG
975 TABLET ORAL ONCE
Refills: 0 | Status: COMPLETED | OUTPATIENT
Start: 2020-03-05 | End: 2020-03-05

## 2020-03-05 RX ADMIN — Medication 975 MILLIGRAM(S): at 19:03

## 2020-03-05 RX ADMIN — Medication 975 MILLIGRAM(S): at 18:13

## 2020-03-05 RX ADMIN — Medication 1 TABLET(S): at 19:02

## 2020-03-05 RX ADMIN — SENNA PLUS 2 TABLET(S): 8.6 TABLET ORAL at 22:57

## 2020-03-05 RX ADMIN — BUPRENORPHINE 1 MILLIGRAM(S): 10 PATCH, EXTENDED RELEASE TRANSDERMAL at 05:45

## 2020-03-05 NOTE — PROGRESS NOTE ADULT - SUBJECTIVE AND OBJECTIVE BOX
OB ANTEPARTUM NOTE    Patient seen and examined at bedside, no acute overnight events. No acute complaints.     Pt reports +FM. Denies LOF, VB, ctx, HA, epigastric pain, blurred vision, CP, SOB, N/V, fevers, and chills.    Vital Signs Last 24 Hours  T(C): 36.8 (03-05-20 @ 05:30), Max: 36.9 (03-04-20 @ 10:34)  HR: 71 (03-05-20 @ 05:32) (66 - 86)  BP: 87/51 (03-05-20 @ 05:32) (80/47 - 136/56)  RR: 18 (03-05-20 @ 05:30) (16 - 18)  SpO2: 94% (03-05-20 @ 05:30) (82% - 100%)    CAPILLARY BLOOD GLUCOSE          Physical Exam:  General: NAD  Abdomen: Soft, non-tender, gravid  Ext: No pain or swelling    Labs:             10.5   12.67 )-----------( 200      ( 03-03 @ 20:30 )             31.1       Mg     5.6     03-04 @ 10:15              MEDICATIONS  (STANDING):  buprenorphine 1 milliGRAM(s) SubLingual <User Schedule>  prenatal multivitamin 1 Tablet(s) Oral daily  senna 2 Tablet(s) Oral at bedtime    MEDICATIONS  (PRN):  simethicone 80 milliGRAM(s) Chew four times a day PRN Gas

## 2020-03-05 NOTE — CHART NOTE - NSCHARTNOTEFT_GEN_A_CORE
R3 OB Note     Called by RN to review tracing. Unable to determine baseline and then ~21:48 baseline noted to be ~105- 110bpm. Called MFM fellow Dr. Choi to review tracing. Overall reassured with moderate variability and accelerations. Will continue to monitor closely.     ELLEN Mckeon PGY3

## 2020-03-05 NOTE — PROGRESS NOTE ADULT - ASSESSMENT
41yo  at 31w0d presenting for rule out  labor, in stable condition. No ctx overnight. 39yo  at 31w0d presenting for rule out  labor, in stable condition. No ctx overnight.      Attending note   Patient evaluated   states feels very anxious and concern about  delivery   patient questions answered   plan continue current care evaluate in am for discharge if no change in cervix C [place]

## 2020-03-05 NOTE — PROGRESS NOTE ADULT - PROBLEM SELECTOR PLAN 1
Neuro: No analgesics required   CV: Hemodynamically stable  Pulm: O2 sat WNL on RA  GI: Regular diet   : Voiding spontaneously  Heme: DVT ppx: Lovenox, SCDs while in bed  ID: Afebrile, No signs of infection  Fetal Well Being: Continuous monitoring, s/p BMZ (3/3-3/4) s/p Mg (3/4) and s/p Amp (3/4)    Dispo: No signs for  labor. Will discuss possible discharge later today after d/w attending    Elio, pgy3

## 2020-03-06 ENCOUNTER — TRANSCRIPTION ENCOUNTER (OUTPATIENT)
Age: 40
End: 2020-03-06

## 2020-03-06 VITALS
OXYGEN SATURATION: 100 % | TEMPERATURE: 98 F | RESPIRATION RATE: 16 BRPM | SYSTOLIC BLOOD PRESSURE: 94 MMHG | DIASTOLIC BLOOD PRESSURE: 53 MMHG | HEART RATE: 64 BPM

## 2020-03-06 LAB
CULTURE RESULTS: SIGNIFICANT CHANGE UP
SPECIMEN SOURCE: SIGNIFICANT CHANGE UP

## 2020-03-06 RX ORDER — DOCUSATE SODIUM 100 MG
10 CAPSULE ORAL
Qty: 0 | Refills: 0 | DISCHARGE

## 2020-03-06 RX ORDER — FAMOTIDINE 10 MG/ML
20 INJECTION INTRAVENOUS ONCE
Refills: 0 | Status: DISCONTINUED | OUTPATIENT
Start: 2020-03-06 | End: 2020-03-06

## 2020-03-06 RX ORDER — ONDANSETRON 8 MG/1
4 TABLET, FILM COATED ORAL ONCE
Refills: 0 | Status: COMPLETED | OUTPATIENT
Start: 2020-03-06 | End: 2020-03-06

## 2020-03-06 RX ADMIN — ONDANSETRON 4 MILLIGRAM(S): 8 TABLET, FILM COATED ORAL at 09:54

## 2020-03-06 RX ADMIN — BUPRENORPHINE 1 MILLIGRAM(S): 10 PATCH, EXTENDED RELEASE TRANSDERMAL at 07:44

## 2020-03-06 RX ADMIN — BUPRENORPHINE 1 MILLIGRAM(S): 10 PATCH, EXTENDED RELEASE TRANSDERMAL at 08:30

## 2020-03-06 RX ADMIN — SIMETHICONE 80 MILLIGRAM(S): 80 TABLET, CHEWABLE ORAL at 07:06

## 2020-03-06 NOTE — DISCHARGE NOTE ANTEPARTUM - MEDICATION SUMMARY - MEDICATIONS TO TAKE
I will START or STAY ON the medications listed below when I get home from the hospital:    buprenorphine 2 mg sublingual tablet  -- 0.5 tab(s) under tongue 2 times a day  -- Indication: For home med chronic pain    Prenatal 1 oral capsule  -- Indication: For Pregnant

## 2020-03-06 NOTE — DISCHARGE NOTE ANTEPARTUM - CARE PROVIDER_API CALL
Lizeth Acuna)  Obstetrics and Gynecology  [address] [place] [address]  Phone: [phone]  Fax: [phone]  Follow Up Time:

## 2020-03-06 NOTE — DISCHARGE NOTE ANTEPARTUM - CARE PLAN
Principal Discharge DX:	 labor in third trimester without delivery  Goal:	Continue prenatal course  Assessment and plan of treatment:	- Follow up with Dr. Acuna on Monday 3/9/2020  - Return with contractions, vaginal bleeding, leaking fluid or decreased fetal movement Principal Discharge DX:	 labor in third trimester without delivery  Goal:	Continue prenatal course  Assessment and plan of treatment:	- Follow up with Dr. Acuna on Monday 3/9/2020  - Follow up with  testing unit within 2 weeks Call [phone] for appointment  - Return with contractions, vaginal bleeding, leaking fluid or decreased fetal movement

## 2020-03-06 NOTE — DISCHARGE NOTE ANTEPARTUM - HOSPITAL COURSE
41yo  at 31w1d presenting for rule out  labor, on admission patient with abdominal tightening, brown spotting, cervix shortened to 2.1 and fFn + on 3/3/2020. Patient given MgSo4, ampicillin, and BMZ 3/3-3/4. ATU sono 3/4: cephalic presentation/ Anterior placenta/ EFW 1801 grams (61%) BPP 8/8. FA right kidney appears enlarged with large cyst. CL 1.8cm with funneling. Patient has since denied contractions, vaginal bleeding, LOF. Endorses +FM. No contractions on TOCO. Patient is stable and ready for discharge home with strict  labor precautions and close outpatient follow up in OB office in Monday. 39yo  at 31w1d presenting for rule out  labor, on admission patient with abdominal tightening, brown spotting, cervix shortened to 2.1 and fFn + on 3/3/2020.  VE closed/ 50/-3. Patient given MgSo4, ampicillin, and BMZ 3/3-3/4. ATU sono 3/4: cephalic presentation/ Anterior placenta/ EFW 1801 grams (61%) BPP 8/8. FA right kidney appears enlarged with large cyst. CL 1.8cm with funneling. Patient has since denied contractions, vaginal bleeding, LOF. Endorses +FM. No contractions on TOCO. Patient is stable and ready for discharge home with strict  labor precautions and close outpatient follow up in OB office in Monday.

## 2020-03-06 NOTE — PROGRESS NOTE ADULT - ATTENDING COMMENTS
Patient seen and agree with above.  Feels irregular tightening, no bleeding, no discharge   VSS  Abd soft NT gravid  Ext - c/c/e NT     EFM +moderate variability +accels - decels   rare ctx    IMP  IUP 30w5d   PTL , CL 3.22 -> 2.19, closed  stable   PLAN  continue observation   continue  mag   2nd dose of beta @ 10 pm  regular diet  YVON Elmore
Attending Note   AGree with above  good FM   No LOF, vb, ctx  Will d/c home today with pelvic rest   f/u in office next week     Shira Navarro

## 2020-03-06 NOTE — PROGRESS NOTE ADULT - PROBLEM SELECTOR PLAN 1
Neuro: No analgesics required   CV: Hemodynamically stable  Pulm: O2 sat WNL on RA  GI: Regular diet   : Voiding spontaneously  Heme: DVT ppx: Lovenox, SCDs while in bed  ID: Afebrile, No signs of infection  Fetal Well Being: Continuous monitoring, s/p BMZ (3/3-3/4) s/p Mg (3/4) and s/p Amp (3/4)    Dispo: No signs for  labor. Will discuss possible discharge later today after d/w attending    Elio, pgy3 Neuro: Pt taking home Subutex   CV: Hemodynamically stable  Pulm: O2 sat WNL on RA  GI: Regular diet   : Voiding spontaneously  Heme: DVT ppx: Lovenox, SCDs while in bed  ID: Afebrile, No signs of infection  Fetal Well Being: Continuous monitoring, s/p BMZ (3/3-3/4) s/p Mg (3/4) and s/p Amp (3/4)    Dispo: No signs for  labor. Will discuss possible discharge later today after d/w attending    Elio, pgy3

## 2020-03-06 NOTE — DISCHARGE NOTE ANTEPARTUM - PLAN OF CARE
Continue prenatal course - Follow up with Dr. Acuna on Monday 3/9/2020  - Return with contractions, vaginal bleeding, leaking fluid or decreased fetal movement - Follow up with Dr. Acuna on Monday 3/9/2020  - Follow up with  testing unit within 2 weeks Call [phone] for appointment  - Return with contractions, vaginal bleeding, leaking fluid or decreased fetal movement

## 2020-03-06 NOTE — PROGRESS NOTE ADULT - SUBJECTIVE AND OBJECTIVE BOX
OB ANTEPARTUM NOTE    Patient seen and examined at bedside, no acute overnight events. No acute complaints.     Pt reports +FM. Denies LOF, VB, ctx, HA, epigastric pain, blurred vision, CP, SOB, N/V, fevers, and chills.    Vital Signs Last 24 Hours  T(C): 36.7 (03-06-20 @ 06:15), Max: 37.2 (03-05-20 @ 14:04)  HR: 75 (03-06-20 @ 06:15) (58 - 75)  BP: 85/48 (03-06-20 @ 06:15) (85/48 - 105/58)  RR: 16 (03-06-20 @ 06:15) (16 - 18)  SpO2: 98% (03-06-20 @ 06:15) (97% - 100%)    CAPILLARY BLOOD GLUCOSE          Physical Exam:  General: NAD  Abdomen: Soft, non-tender, gravid  Ext: No pain or swelling    Labs:    Mg     5.6     03-04 @ 10:15              MEDICATIONS  (STANDING):  buprenorphine 1 milliGRAM(s) SubLingual <User Schedule>  famotidine    Tablet 20 milliGRAM(s) Oral once  prenatal multivitamin 1 Tablet(s) Oral daily  senna 2 Tablet(s) Oral at bedtime    MEDICATIONS  (PRN):  simethicone 80 milliGRAM(s) Chew four times a day PRN Gas

## 2020-03-06 NOTE — DISCHARGE NOTE ANTEPARTUM - ADDITIONAL INSTRUCTIONS
Follow up with OB on Monday Follow up with OB on Monday   Follow up with  testing unit within 2 weeks Call [phone] for appointment

## 2020-03-06 NOTE — DISCHARGE NOTE ANTEPARTUM - PATIENT PORTAL LINK FT
You can access the FollowMyHealth Patient Portal offered by [facility] by registering at the following website: [email or web address]. By joining [employer]’s FollowMyHealth portal, you will also be able to view your health information using other applications (apps) compatible with our system.

## 2020-03-09 DIAGNOSIS — O09.523 SUPERVISION OF ELDERLY MULTIGRAVIDA, THIRD TRIMESTER: ICD-10-CM

## 2020-03-09 DIAGNOSIS — O60.03 PRETERM LABOR WITHOUT DELIVERY, THIRD TRIMESTER: ICD-10-CM

## 2020-03-09 DIAGNOSIS — O35.5XX0 MATERNAL CARE FOR (SUSPECTED) DAMAGE TO FETUS BY DRUGS, NOT APPLICABLE OR UNSPECIFIED: ICD-10-CM

## 2020-03-10 ENCOUNTER — OUTPATIENT (OUTPATIENT)
Dept: INPATIENT UNIT | Facility: HOSPITAL | Age: 40
LOS: 1 days | Discharge: HOME | End: 2020-03-10
Payer: COMMERCIAL

## 2020-03-10 VITALS
DIASTOLIC BLOOD PRESSURE: 57 MMHG | RESPIRATION RATE: 16 BRPM | SYSTOLIC BLOOD PRESSURE: 98 MMHG | TEMPERATURE: 99 F | HEART RATE: 74 BPM

## 2020-03-10 VITALS — SYSTOLIC BLOOD PRESSURE: 105 MMHG | HEART RATE: 67 BPM | DIASTOLIC BLOOD PRESSURE: 58 MMHG

## 2020-03-10 DIAGNOSIS — G56.00 CARPAL TUNNEL SYNDROME, UNSPECIFIED UPPER LIMB: Chronic | ICD-10-CM

## 2020-03-10 DIAGNOSIS — O26.899 OTHER SPECIFIED PREGNANCY RELATED CONDITIONS, UNSPECIFIED TRIMESTER: ICD-10-CM

## 2020-03-10 DIAGNOSIS — Z98.89 OTHER SPECIFIED POSTPROCEDURAL STATES: Chronic | ICD-10-CM

## 2020-03-10 DIAGNOSIS — Z3A.00 WEEKS OF GESTATION OF PREGNANCY NOT SPECIFIED: ICD-10-CM

## 2020-03-10 LAB
APPEARANCE UR: SIGNIFICANT CHANGE UP
BACTERIA # UR AUTO: HIGH
BASOPHILS # BLD AUTO: 0.06 K/UL — SIGNIFICANT CHANGE UP (ref 0–0.2)
BASOPHILS NFR BLD AUTO: 0.4 % — SIGNIFICANT CHANGE UP (ref 0–2)
BILIRUB UR-MCNC: NEGATIVE — SIGNIFICANT CHANGE UP
BLOOD UR QL VISUAL: HIGH
COLOR SPEC: YELLOW — SIGNIFICANT CHANGE UP
EOSINOPHIL # BLD AUTO: 0.16 K/UL — SIGNIFICANT CHANGE UP (ref 0–0.5)
EOSINOPHIL NFR BLD AUTO: 1.2 % — SIGNIFICANT CHANGE UP (ref 0–6)
FIBRINOGEN PPP-MCNC: 576 MG/DL — HIGH (ref 300–520)
GLUCOSE UR-MCNC: NEGATIVE — SIGNIFICANT CHANGE UP
HCT VFR BLD CALC: 32.4 % — LOW (ref 34.5–45)
HGB BLD-MCNC: 10.9 G/DL — LOW (ref 11.5–15.5)
HYALINE CASTS # UR AUTO: HIGH
IMM GRANULOCYTES NFR BLD AUTO: 3.7 % — HIGH (ref 0–1.5)
KETONES UR-MCNC: NEGATIVE — SIGNIFICANT CHANGE UP
LEUKOCYTE ESTERASE UR-ACNC: NEGATIVE — SIGNIFICANT CHANGE UP
LYMPHOCYTES # BLD AUTO: 18 % — SIGNIFICANT CHANGE UP (ref 13–44)
LYMPHOCYTES # BLD AUTO: 2.4 K/UL — SIGNIFICANT CHANGE UP (ref 1–3.3)
MCHC RBC-ENTMCNC: 30 PG — SIGNIFICANT CHANGE UP (ref 27–34)
MCHC RBC-ENTMCNC: 33.6 % — SIGNIFICANT CHANGE UP (ref 32–36)
MCV RBC AUTO: 89.3 FL — SIGNIFICANT CHANGE UP (ref 80–100)
MONOCYTES # BLD AUTO: 0.99 K/UL — HIGH (ref 0–0.9)
MONOCYTES NFR BLD AUTO: 7.4 % — SIGNIFICANT CHANGE UP (ref 2–14)
MUCOUS THREADS # UR AUTO: SIGNIFICANT CHANGE UP
NEUTROPHILS # BLD AUTO: 9.26 K/UL — HIGH (ref 1.8–7.4)
NEUTROPHILS NFR BLD AUTO: 69.3 % — SIGNIFICANT CHANGE UP (ref 43–77)
NITRITE UR-MCNC: NEGATIVE — SIGNIFICANT CHANGE UP
NRBC # FLD: 0 K/UL — SIGNIFICANT CHANGE UP (ref 0–0)
PH UR: 6 — SIGNIFICANT CHANGE UP (ref 5–8)
PLATELET # BLD AUTO: 215 K/UL — SIGNIFICANT CHANGE UP (ref 150–400)
PMV BLD: 9.5 FL — SIGNIFICANT CHANGE UP (ref 7–13)
PROT UR-MCNC: 50 — SIGNIFICANT CHANGE UP
RBC # BLD: 3.63 M/UL — LOW (ref 3.8–5.2)
RBC # FLD: 13.2 % — SIGNIFICANT CHANGE UP (ref 10.3–14.5)
RBC CASTS # UR COMP ASSIST: SIGNIFICANT CHANGE UP (ref 0–?)
SP GR SPEC: 1.03 — SIGNIFICANT CHANGE UP (ref 1–1.04)
SQUAMOUS # UR AUTO: SIGNIFICANT CHANGE UP
UROBILINOGEN FLD QL: SIGNIFICANT CHANGE UP
WBC # BLD: 13.37 K/UL — HIGH (ref 3.8–10.5)
WBC # FLD AUTO: 13.37 K/UL — HIGH (ref 3.8–10.5)
WBC UR QL: SIGNIFICANT CHANGE UP (ref 0–?)

## 2020-03-10 PROCEDURE — 99214 OFFICE O/P EST MOD 30 MIN: CPT | Mod: 25

## 2020-03-10 PROCEDURE — 76817 TRANSVAGINAL US OBSTETRIC: CPT | Mod: 26

## 2020-03-10 PROCEDURE — 76818 FETAL BIOPHYS PROFILE W/NST: CPT | Mod: 26

## 2020-03-10 PROCEDURE — 59025 FETAL NON-STRESS TEST: CPT | Mod: 26

## 2020-03-10 PROCEDURE — 96360 HYDRATION IV INFUSION INIT: CPT

## 2020-03-10 RX ORDER — SODIUM CHLORIDE 9 MG/ML
1000 INJECTION, SOLUTION INTRAVENOUS ONCE
Refills: 0 | Status: COMPLETED | OUTPATIENT
Start: 2020-03-10 | End: 2020-03-10

## 2020-03-10 RX ORDER — ACETAMINOPHEN 500 MG
975 TABLET ORAL ONCE
Refills: 0 | Status: COMPLETED | OUTPATIENT
Start: 2020-03-10 | End: 2020-03-10

## 2020-03-10 RX ADMIN — SODIUM CHLORIDE 1000 MILLILITER(S): 9 INJECTION, SOLUTION INTRAVENOUS at 21:00

## 2020-03-10 RX ADMIN — Medication 975 MILLIGRAM(S): at 22:18

## 2020-03-10 NOTE — OB RN TRIAGE NOTE - CHIEF COMPLAINT QUOTE
discharge since yesterday/old blood, blood in urine yesterday discharge since yesterday/old blood, blood in urine yesterday  patient admitted 3/3 to 3/6 s/p bam

## 2020-03-10 NOTE — OB PROVIDER TRIAGE NOTE - NS_FETALANOMALOTHER_OBGYN_ALL_OB_FT
Unilateral cystic kidney. The right kidney is enlarged (length 5.7 cm) with abnormal architecture-no pelvis, multiple cysts of different sizes, echogenic stroma.

## 2020-03-10 NOTE — OB PROVIDER TRIAGE NOTE - NSHPPHYSICALEXAM_GEN_ALL_CORE
39 y/o  female, para 1011 @ 31+5 wks gestation, single IUP.   No evidence of active vaginal bleeding. PTL Vs UTI  Gen: NAD; A+O x3  Cardiac: R/R/R  Pulm: CTAB  Abdomen: Gravid, soft, son-tender  VS--BP: 98/57, P 74, RR 16, T 37.0, Pt denies any pain  Blood type: O-Positive  Cat 1 tracin bpm, moderate variability, +accels, no decels  [place]: Uterine irritability  SSE: ~10 of dark/black discharge evacuated with a quarter-sized clot, no active bleeding noted, pedunculated cervical polyp noted at the os. Otherwise cervix appears closed and posterior  TVUS: Cervical length: 1.5-1.6 cm with dynamic change to 1.16cm, marked funneling, no previa  Limited TAS: SLIUP, Cephalic, Anterior placenta, BPP 8/8, GALILEO 18.01cm, EFW 1776g  Plan:  IV insert, CBC, Fibrinogen, UA/C&S, IVF bolus

## 2020-03-10 NOTE — OB PROVIDER TRIAGE NOTE - NS_OBGYNHISTORY_OBGYN_ALL_OB_FT
2014-Primary c/s @ 41+1 wks for arrest of descent. Male, 8.3#  2012-Early complete SAB  Pt denies any h/o: Abn PAP, ovarian cysts, fibroids, breast problems, STDs/STIs

## 2020-03-10 NOTE — OB PROVIDER TRIAGE NOTE - PLAN OF CARE
Patient cleared for discharge home by Dr Benitez  Follow-up with Dr Acuna in the office  Continue fetal kick counts  Return to triage for: Decreased fetal movement, leakage of fluid, vaginal bleeding, contractions, fever, chills or for any other concerns.    labor precautions reviewed with patient. Patient verbalized understanding.

## 2020-03-10 NOTE — OB PROVIDER TRIAGE NOTE - NSHPLABSRESULTS_GEN_ALL_CORE
Vital Signs Last 24 Hrs  T(C): 37.0 (10 Mar 2020 18:27), Max: 37 (10 Mar 2020 18:12)  T(F): 98.6 (10 Mar 2020 18:27), Max: 98.6 (10 Mar 2020 18:12)  HR: 74 (10 Mar 2020 18:32) (74 - 74)  BP: 98/57 (10 Mar 2020 18:32) (98/57 - 98/57)  BP(mean): --  RR: 16 (10 Mar 2020 18:27) (16 - 16)  SpO2: -- Vital Signs Last 24 Hrs  T(C): 37.0 (10 Mar 2020 18:27), Max: 37 (10 Mar 2020 18:12)  T(F): 98.6 (10 Mar 2020 18:27), Max: 98.6 (10 Mar 2020 18:12)  HR: 74 (10 Mar 2020 18:32) (74 - 74)  BP: 98/57 (10 Mar 2020 18:32) (98/57 - 98/57)  BP(mean): --  RR: 16 (10 Mar 2020 18:27) (16 - 16)  SpO2: --                          10.9   13.37 )-----------( 215      ( 10 Mar 2020 20:45 )             32.4     FIBRINOGEN: 576     Urinalysis Basic - ( 10 Mar 2020 19:16 )    Color: YELLOW / Appearance: Lt TURBID / S.031 / pH: 6.0  Gluc: NEGATIVE / Ketone: NEGATIVE  / Bili: NEGATIVE / Urobili: TRACE   Blood: MODERATE / Protein: 50 / Nitrite: NEGATIVE   Leuk Esterase: NEGATIVE / RBC: 0-2 / WBC 3-5   Sq Epi: MODERATE / Non Sq Epi: x / Bacteria: MODERATE

## 2020-03-10 NOTE — OB PROVIDER TRIAGE NOTE - HISTORY OF PRESENT ILLNESS
Patient of Dr Acuna  41 y/o  female, para 1011 @ 31+5 wks gestation, single IUP.   Presents to triage with c/o dark brown vaginal discharge since yesterday morning. Patient also with c/o painless abdominal tightening. Patient denies any recent intercourse anything PV. Denies any urinary symptoms, fever, chills.   Patient endorses good fetal movement and denies any leakage of fluid. GBS negative 3/3/20  A/P complications: Admission 3/3/20-3/6/20 s/p Betamethasone/Ampicillin/Magnesium for shortened cervix. 1.7 cm. +FFN. Patient weening off Buprenorphine     Fetal Alert: Unilateral cystic kidney. The right kidney is enlarged (length 5.7 cm) with abnormal architecture-no pelvis, multiple cysts of different sizes, echogenic stroma. Exposure to maternal intake of Buprenorphine.    Allergy: NKDA  Meds: Prenatal vitamins; Buprenorphine  PMH: Carpel Tunnel; Chronic back pain  PSH: D/C ('14); Carpel tunnel Sx ('09)  OBgynHx    2014-Primary c/s @ 41+1 wks for arrest of descent. Male, 8.3#  -Early complete SAB  Pt denies any h/o: Abn PAP, ovarian cysts, fibroids, breast problems, STDs/STIs  PSY: Anxiety disorder, no meds/no therapy  EtOH/ Smoke/ Recreational substance use: Denies  FH: Hypertension/Stroke (Father/)  H/W/BMI: 62"/119#/21.8 Patient of Dr Acuna  39 y/o  female, para 1011 @ 31+5 wks gestation, single IUP.   Presents to triage with c/o dark brown vaginal discharge since yesterday morning. Patient also with c/o painless abdominal tightening. Patient denies any recent intercourse anything PV. Denies any urinary symptoms, fever, chills.   Patient endorses good fetal movement and denies any leakage of fluid. GBS negative 3/3/20  A/P complications: Admission 3/3/20-3/6/20 s/p Betamethasone/Ampicillin/Magnesium for shortened cervix. 1.7 cm. +FFN. Patient weening off Buprenorphine     Fetal Alert: Unilateral cystic kidney. The right kidney is enlarged (length 5.7 cm) with abnormal architecture-no pelvis, multiple cysts of different sizes, echogenic stroma. Eposure to maternal use of Buprenorphine during pregnancy.    Allergy: NKDA  Meds: Prenatal vitamins; Buprenorphine  PMH: Carpel Tunnel; Chronic back pain  PSH: D/C ('14); Carpel tunnel Sx ('09)  OBgynHx    2014-Primary c/s @ 41+1 wks for arrest of descent. Male, 8.3#  -Early complete SAB  Pt denies any h/o: Abn PAP, ovarian cysts, fibroids, breast problems, STDs/STIs  PSY: Anxiety disorder, no meds/no therapy  EtOH/ Smoke/ Recreational substance use: Denies  FH: Hypertension/Stroke (Father/)  H/W/BMI: 62"/119#/21.8

## 2020-03-10 NOTE — OB PROVIDER TRIAGE NOTE - NSOBPROVIDERNOTE_OBGYN_ALL_OB_FT
Dr Benitez notified of above findings-labs pending, IVF bolus pending. Dr Benitez notified of above findings-labs pending, IVF bolus pending.  Patient s/p 1 liter of IVF and tylenol. Cat 1 tracing remains, uterine irritability on toco. Patient denies any pain.  Labs resulted above, urine culture sent.  Dr Benitez updated on above  Patient cleared to be discharge home by Dr Benitez  Patient to follow-up in the office with Dr Acuna for routine OB care.

## 2020-03-10 NOTE — OB PROVIDER TRIAGE NOTE - ADDITIONAL INSTRUCTIONS
Follow-up with Dr Acuna in the office  Continue fetal kick counts  Return to triage for: Decreased fetal movement, leakage of fluid, vaginal bleeding, contractions, fever, chills or for any other concerns.

## 2020-03-12 ENCOUNTER — INPATIENT (INPATIENT)
Facility: HOSPITAL | Age: 40
LOS: 1 days | Discharge: HOME | End: 2020-03-14
Attending: OBSTETRICS & GYNECOLOGY | Admitting: OBSTETRICS & GYNECOLOGY
Payer: COMMERCIAL

## 2020-03-12 VITALS
RESPIRATION RATE: 20 BRPM | HEART RATE: 77 BPM | TEMPERATURE: 99 F | SYSTOLIC BLOOD PRESSURE: 95 MMHG | DIASTOLIC BLOOD PRESSURE: 45 MMHG

## 2020-03-12 DIAGNOSIS — O26.899 OTHER SPECIFIED PREGNANCY RELATED CONDITIONS, UNSPECIFIED TRIMESTER: ICD-10-CM

## 2020-03-12 DIAGNOSIS — O46.93 ANTEPARTUM HEMORRHAGE, UNSPECIFIED, THIRD TRIMESTER: ICD-10-CM

## 2020-03-12 DIAGNOSIS — G56.00 CARPAL TUNNEL SYNDROME, UNSPECIFIED UPPER LIMB: Chronic | ICD-10-CM

## 2020-03-12 DIAGNOSIS — Z98.89 OTHER SPECIFIED POSTPROCEDURAL STATES: Chronic | ICD-10-CM

## 2020-03-12 DIAGNOSIS — Z3A.00 WEEKS OF GESTATION OF PREGNANCY NOT SPECIFIED: ICD-10-CM

## 2020-03-12 LAB
APPEARANCE UR: SIGNIFICANT CHANGE UP
APTT BLD: 25.2 SEC — LOW (ref 27.5–36.3)
BACTERIA # UR AUTO: SIGNIFICANT CHANGE UP
BASOPHILS # BLD AUTO: 0.06 K/UL — SIGNIFICANT CHANGE UP (ref 0–0.2)
BASOPHILS NFR BLD AUTO: 0.5 % — SIGNIFICANT CHANGE UP (ref 0–2)
BILIRUB UR-MCNC: NEGATIVE — SIGNIFICANT CHANGE UP
BLD GP AB SCN SERPL QL: NEGATIVE — SIGNIFICANT CHANGE UP
BLOOD UR QL VISUAL: HIGH
COLOR SPEC: YELLOW — SIGNIFICANT CHANGE UP
CULTURE RESULTS: SIGNIFICANT CHANGE UP
EOSINOPHIL # BLD AUTO: 0.11 K/UL — SIGNIFICANT CHANGE UP (ref 0–0.5)
EOSINOPHIL NFR BLD AUTO: 0.9 % — SIGNIFICANT CHANGE UP (ref 0–6)
FIBRINOGEN PPP-MCNC: 527 MG/DL — HIGH (ref 300–520)
GLUCOSE UR-MCNC: NEGATIVE — SIGNIFICANT CHANGE UP
HCT VFR BLD CALC: 30.3 % — LOW (ref 34.5–45)
HGB BLD-MCNC: 10.3 G/DL — LOW (ref 11.5–15.5)
HYALINE CASTS # UR AUTO: SIGNIFICANT CHANGE UP
IMM GRANULOCYTES NFR BLD AUTO: 2.2 % — HIGH (ref 0–1.5)
INR BLD: 1.02 — SIGNIFICANT CHANGE UP (ref 0.88–1.17)
KETONES UR-MCNC: NEGATIVE — SIGNIFICANT CHANGE UP
LEUKOCYTE ESTERASE UR-ACNC: NEGATIVE — SIGNIFICANT CHANGE UP
LYMPHOCYTES # BLD AUTO: 18.2 % — SIGNIFICANT CHANGE UP (ref 13–44)
LYMPHOCYTES # BLD AUTO: 2.28 K/UL — SIGNIFICANT CHANGE UP (ref 1–3.3)
MCHC RBC-ENTMCNC: 30.6 PG — SIGNIFICANT CHANGE UP (ref 27–34)
MCHC RBC-ENTMCNC: 34 % — SIGNIFICANT CHANGE UP (ref 32–36)
MCV RBC AUTO: 89.9 FL — SIGNIFICANT CHANGE UP (ref 80–100)
MONOCYTES # BLD AUTO: 1.07 K/UL — HIGH (ref 0–0.9)
MONOCYTES NFR BLD AUTO: 8.5 % — SIGNIFICANT CHANGE UP (ref 2–14)
NEUTROPHILS # BLD AUTO: 8.74 K/UL — HIGH (ref 1.8–7.4)
NEUTROPHILS NFR BLD AUTO: 69.7 % — SIGNIFICANT CHANGE UP (ref 43–77)
NITRITE UR-MCNC: NEGATIVE — SIGNIFICANT CHANGE UP
NRBC # FLD: 0 K/UL — SIGNIFICANT CHANGE UP (ref 0–0)
PH UR: 6 — SIGNIFICANT CHANGE UP (ref 5–8)
PLATELET # BLD AUTO: 221 K/UL — SIGNIFICANT CHANGE UP (ref 150–400)
PMV BLD: 9.8 FL — SIGNIFICANT CHANGE UP (ref 7–13)
PROT UR-MCNC: 20 — SIGNIFICANT CHANGE UP
PROTHROM AB SERPL-ACNC: 11.6 SEC — SIGNIFICANT CHANGE UP (ref 9.8–13.1)
RBC # BLD: 3.37 M/UL — LOW (ref 3.8–5.2)
RBC # FLD: 13.2 % — SIGNIFICANT CHANGE UP (ref 10.3–14.5)
RBC CASTS # UR COMP ASSIST: HIGH (ref 0–?)
RH IG SCN BLD-IMP: POSITIVE — SIGNIFICANT CHANGE UP
SP GR SPEC: 1.02 — SIGNIFICANT CHANGE UP (ref 1–1.04)
SPECIMEN SOURCE: SIGNIFICANT CHANGE UP
SQUAMOUS # UR AUTO: SIGNIFICANT CHANGE UP
UROBILINOGEN FLD QL: NORMAL — SIGNIFICANT CHANGE UP
WBC # BLD: 12.53 K/UL — HIGH (ref 3.8–10.5)
WBC # FLD AUTO: 12.53 K/UL — HIGH (ref 3.8–10.5)
WBC UR QL: SIGNIFICANT CHANGE UP (ref 0–?)

## 2020-03-12 RX ORDER — BUPRENORPHINE 10 UG/H
1 PATCH, EXTENDED RELEASE TRANSDERMAL
Refills: 0 | Status: DISCONTINUED | OUTPATIENT
Start: 2020-03-12 | End: 2020-03-14

## 2020-03-12 RX ORDER — SODIUM CHLORIDE 9 MG/ML
500 INJECTION, SOLUTION INTRAVENOUS ONCE
Refills: 0 | Status: DISCONTINUED | OUTPATIENT
Start: 2020-03-12 | End: 2020-03-13

## 2020-03-12 RX ORDER — SODIUM CHLORIDE 9 MG/ML
1000 INJECTION, SOLUTION INTRAVENOUS
Refills: 0 | Status: DISCONTINUED | OUTPATIENT
Start: 2020-03-12 | End: 2020-03-12

## 2020-03-12 RX ORDER — BUPRENORPHINE 10 UG/H
0.5 PATCH, EXTENDED RELEASE TRANSDERMAL
Qty: 0 | Refills: 0 | DISCHARGE

## 2020-03-12 RX ORDER — SODIUM CHLORIDE 9 MG/ML
1000 INJECTION, SOLUTION INTRAVENOUS
Refills: 0 | Status: DISCONTINUED | OUTPATIENT
Start: 2020-03-12 | End: 2020-03-13

## 2020-03-12 NOTE — OB PROVIDER TRIAGE NOTE - NSOBPROVIDERNOTE_OBGYN_ALL_OB_FT
's patient is a 41 y/o  female @ 32 EGA reporting of going to pick her son up and feeling two gushes. Patient reports of seeing blood in her underwear. Patient reports of fetal movement. Denies intercourse, urinary symptoms, fever, chills. Patient reports of fetal movement    AP complications:   3/10/20: seen in D&T brown vaginal discharge, quarter-size clot removed, pedunculated cervical polyp at the os, cervical length 1.5-1.6 cm  3/10/20 TAS: cephalic, anterior placena,  BPP, GALILEO 18, EFW 1776  3/3/20-3/6/20: s/p Betamethasone, Ampicillin, Magnesium,   shortened cervix of 1.7 cm, FFN positive    Fetal Alert: Unilateral cystic kidney. The right kidney is enlarged (length 5.7 cm) with abnormal architecture-no pelvis, multiple cysts of different sizes, echogenic stroma. Eposure to maternal use of Buprenorphine during pregnancy.    Allergy: NKDA  Meds: Prenatal vitamins; Buprenorphine  PMH: Carpel Tunnel; Chronic back pain  PSH: D/C ('14); Carpel tunnel Sx ('09)  OBgynHx    2014-Primary c/s @ 41+1 wks for arrest of descent. Male, 8.3#  2012-Early complete SAB  Pt denies any h/o: Abn PAP, ovarian cysts, fibroids, breast problems, STDs/STIs  PSY: Anxiety disorder, no meds/no therapy  EtOH/ Smoke/ Recreational substance use: Denies  FH: Hypertension/Stroke (Father/)  H/W/BMI: 62"/119#/21.8     Vital Signs Last 24 Hrs  T(C): 37 (12 Mar 2020 16:13), Max: 37 (12 Mar 2020 16:13)  T(F): 98.6 (12 Mar 2020 16:13), Max: 98.6 (12 Mar 2020 16:13)  HR: 75 (12 Mar 2020 16:41) (75 - 77)  BP: 87/52 (12 Mar 2020 16:41) (87/52 - 95/45)  RR: 20 (12 Mar 2020 16:13) (20 - 20)  TAS: cephalic presentation  SSE: clot present in vaginal vault, removed by   SVE: closed as per     Admit to labor and delivery, increased concern for abruption, patient for close observation  - NPO  - admission labs and abruption labs  - 2 units of PRBC on hold  - plan discussed with  and

## 2020-03-12 NOTE — OB PROVIDER H&P - PROBLEM SELECTOR PLAN 1
Admit to labor and delivery, increased concern for abruption, patient for close observation  - NPO  - admission labs and abruption labs  - 2 units of PRBC on hold  - plan discussed with  and

## 2020-03-12 NOTE — OB PROVIDER TRIAGE NOTE - NSHPPHYSICALEXAM_GEN_ALL_CORE
Vital Signs Last 24 Hrs  T(C): 37 (12 Mar 2020 16:13), Max: 37 (12 Mar 2020 16:13)  T(F): 98.6 (12 Mar 2020 16:13), Max: 98.6 (12 Mar 2020 16:13)  HR: 75 (12 Mar 2020 16:41) (75 - 77)  BP: 87/52 (12 Mar 2020 16:41) (87/52 - 95/45)  RR: 20 (12 Mar 2020 16:13) (20 - 20)  TAS: cephalic presentation  SSE: clot present in vaginal vault, removed by   SVE: closed as per

## 2020-03-12 NOTE — OB RN PATIENT PROFILE - NS_FETALCONCERNS_OBGYN_ALL_OB_FT
unilateral cystic kidney - right kidney enlarged  exposure to maternal use of buprenorphine during pregnancy

## 2020-03-12 NOTE — OB PROVIDER H&P - HISTORY OF PRESENT ILLNESS
's patient is a 41 y/o  female @ 32 EGA reporting of going to pick her son up and feeling two gushes. Patient reports of seeing blood in her underwear. Patient reports of fetal movement. Denies intercourse, urinary symptoms, fever, chills. Patient reports of fetal movement    AP complications:   3/10/20: seen in D&T brown vaginal discharge, quarter-size clot removed, pedunculated cervical polyp at the os, cervical length 1.5-1.6 cm  3/10/20 TAS: cephalic, anterior placena,  BPP, GALILEO 18, EFW 1776  3/3/20-3/6/20: s/p Betamethasone, Ampicillin, Magnesium,   shortened cervix of 1.7 cm, FFN positive    Fetal Alert: Unilateral cystic kidney. The right kidney is enlarged (length 5.7 cm) with abnormal architecture-no pelvis, multiple cysts of different sizes, echogenic stroma. Eposure to maternal use of Buprenorphine during pregnancy.    Allergy: NKDA  Meds: Prenatal vitamins; Buprenorphine  PMH: Carpel Tunnel; Chronic back pain  PSH: D/C ('14); Carpel tunnel Sx ('09)  OBgynHx    2014-Primary c/s @ 41+1 wks for arrest of descent. Male, 8.3#  -Early complete SAB  Pt denies any h/o: Abn PAP, ovarian cysts, fibroids, breast problems, STDs/STIs  PSY: Anxiety disorder, no meds/no therapy  EtOH/ Smoke/ Recreational substance use: Denies  FH: Hypertension/Stroke (Father/)  H/W/BMI: 62"/119#/21.8
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
## 2020-03-12 NOTE — OB PROVIDER H&P - ATTENDING COMMENTS
39 yo P1 previous  at 32 wks gestation with vaginal bleeding   Admit for presumed abruptio   plan monitoting and MFM consultation.

## 2020-03-12 NOTE — OB PROVIDER TRIAGE NOTE - HISTORY OF PRESENT ILLNESS
's patient is a 39 y/o  female @ 32 EGA reporting of going to pick her son up and feeling two gushes. Patient reports of seeing blood in her underwear. Patient reports of fetal movement. Denies intercourse, urinary symptoms, fever, chills. Patient reports of fetal movement    AP complications:   3/10/20: seen in D&T brown vaginal discharge, quarter-size clot removed, pedunculated cervical polyp at the os, cervical length 1.5-1.6 cm  3/10/20 TAS: cephalic, anterior placena, 8 BPP, GALILEO 18, EFW 1776  3/3/20-3/6/20: s/p Betamethasone, Ampicillin, Magnesium,   shortened cervix of 1.7 cm, FFN positive 's patient is a 39 y/o  female @ 32 EGA reporting of going to pick her son up and feeling two gushes. Patient reports of seeing blood in her underwear. Patient reports of fetal movement. Denies intercourse, urinary symptoms, fever, chills. Patient reports of fetal movement    AP complications:   3/10/20: seen in D&T brown vaginal discharge, quarter-size clot removed, pedunculated cervical polyp at the os, cervical length 1.5-1.6 cm  3/10/20 TAS: cephalic, anterior placena,  BPP, GALILEO 18, EFW 1776  3/3/20-3/6/20: s/p Betamethasone, Ampicillin, Magnesium,   shortened cervix of 1.7 cm, FFN positive    Fetal Alert: Unilateral cystic kidney. The right kidney is enlarged (length 5.7 cm) with abnormal architecture-no pelvis, multiple cysts of different sizes, echogenic stroma. Eposure to maternal use of Buprenorphine during pregnancy.    Allergy: NKDA  Meds: Prenatal vitamins; Buprenorphine  PMH: Carpel Tunnel; Chronic back pain  PSH: D/C ('14); Carpel tunnel Sx ('09)  OBgynHx    2014-Primary c/s @ 41+1 wks for arrest of descent. Male, 8.3#  -Early complete SAB  Pt denies any h/o: Abn PAP, ovarian cysts, fibroids, breast problems, STDs/STIs  PSY: Anxiety disorder, no meds/no therapy  EtOH/ Smoke/ Recreational substance use: Denies  FH: Hypertension/Stroke (Father/)  H/W/BMI: 62"/119#/21.8

## 2020-03-12 NOTE — OB RN PATIENT PROFILE - CURRENT PREGNANCY COMPLICATIONS, OB PROFILE
Gestational Age less than 36 Weeks Gestational Age less than 36 Weeks/Maternal Unknown GBS/Vaginal Bleeding

## 2020-03-12 NOTE — OB PROVIDER H&P - ASSESSMENT
's patient is a 39 y/o  female @ 32 EGA reporting of going to pick her son up and feeling two gushes. Patient reports of seeing blood in her underwear. Patient reports of fetal movement. Denies intercourse, urinary symptoms, fever, chills. Patient reports of fetal movement    AP complications:   -Patient weening off Buprenorphine   3/10/20: seen in D&T brown vaginal discharge, quarter-size clot removed, pedunculated cervical polyp at the os, cervical length 1.5-1.6 cm  3/10/20 TAS: cephalic, anterior placena, 8 BPP, GALILEO 18, EFW 1776  3/3/20-3/6/20: s/p Betamethasone, Ampicillin, Magnesium,   shortened cervix of 1.7 cm, FFN positive    Fetal Alert: Unilateral cystic kidney. The right kidney is enlarged (length 5.7 cm) with abnormal architecture-no pelvis, multiple cysts of different sizes, echogenic stroma. Eposure to maternal use of Buprenorphine during pregnancy.    Allergy: NKDA  Meds: Prenatal vitamins; Buprenorphine  PMH: Carpel Tunnel; Chronic back pain  PSH: D/C ('14); Carpel tunnel Sx ('09)  OBgynHx    2014-Primary c/s @ 41+1 wks for arrest of descent. Male, 8.3#  2012-Early complete SAB  Pt denies any h/o: Abn PAP, ovarian cysts, fibroids, breast problems, STDs/STIs  PSY: Anxiety disorder, no meds/no therapy  EtOH/ Smoke/ Recreational substance use: Denies  FH: Hypertension/Stroke (Father/)  H/W/BMI: 62"/119#/21.8     Vital Signs Last 24 Hrs  T(C): 37 (12 Mar 2020 16:13), Max: 37 (12 Mar 2020 16:13)  T(F): 98.6 (12 Mar 2020 16:13), Max: 98.6 (12 Mar 2020 16:13)  HR: 75 (12 Mar 2020 16:41) (75 - 77)  BP: 87/52 (12 Mar 2020 16:41) (87/52 - 95/45)  RR: 20 (12 Mar 2020 16:13) (20 - 20)  TAS: cephalic presentation  SSE: clot present in vaginal vault, removed by   SVE: closed as per     Admit to labor and delivery, increased concern for abruption, patient for close observation  - NPO  - admission labs and abruption labs  - 2 units of PRBC on hold  - plan discussed with  and

## 2020-03-13 ENCOUNTER — APPOINTMENT (OUTPATIENT)
Dept: ANTEPARTUM | Facility: CLINIC | Age: 40
End: 2020-03-13

## 2020-03-13 ENCOUNTER — TRANSCRIPTION ENCOUNTER (OUTPATIENT)
Age: 40
End: 2020-03-13

## 2020-03-13 DIAGNOSIS — O26.859 SPOTTING COMPLICATING PREGNANCY, UNSPECIFIED TRIMESTER: ICD-10-CM

## 2020-03-13 LAB
APTT BLD: 25.6 SEC — LOW (ref 27.5–36.3)
APTT BLD: 26.5 SEC — LOW (ref 27.5–36.3)
BASOPHILS # BLD AUTO: 0.02 K/UL — SIGNIFICANT CHANGE UP (ref 0–0.2)
BASOPHILS NFR BLD AUTO: 0.3 % — SIGNIFICANT CHANGE UP (ref 0–2)
EOSINOPHIL # BLD AUTO: 0.07 K/UL — SIGNIFICANT CHANGE UP (ref 0–0.5)
EOSINOPHIL NFR BLD AUTO: 0.9 % — SIGNIFICANT CHANGE UP (ref 0–6)
FIBRINOGEN PPP-MCNC: 452 MG/DL — SIGNIFICANT CHANGE UP (ref 300–520)
FIBRINOGEN PPP-MCNC: 488 MG/DL — SIGNIFICANT CHANGE UP (ref 300–520)
HCT VFR BLD CALC: 27.6 % — LOW (ref 34.5–45)
HCT VFR BLD CALC: 28.8 % — LOW (ref 34.5–45)
HGB BLD-MCNC: 9.4 G/DL — LOW (ref 11.5–15.5)
HGB BLD-MCNC: 9.5 G/DL — LOW (ref 11.5–15.5)
IMM GRANULOCYTES NFR BLD AUTO: 2 % — HIGH (ref 0–1.5)
INR BLD: 0.97 — SIGNIFICANT CHANGE UP (ref 0.88–1.17)
INR BLD: 1.02 — SIGNIFICANT CHANGE UP (ref 0.88–1.17)
LYMPHOCYTES # BLD AUTO: 1.86 K/UL — SIGNIFICANT CHANGE UP (ref 1–3.3)
LYMPHOCYTES # BLD AUTO: 23.7 % — SIGNIFICANT CHANGE UP (ref 13–44)
MCHC RBC-ENTMCNC: 30 PG — SIGNIFICANT CHANGE UP (ref 27–34)
MCHC RBC-ENTMCNC: 30.6 PG — SIGNIFICANT CHANGE UP (ref 27–34)
MCHC RBC-ENTMCNC: 33 % — SIGNIFICANT CHANGE UP (ref 32–36)
MCHC RBC-ENTMCNC: 34.1 % — SIGNIFICANT CHANGE UP (ref 32–36)
MCV RBC AUTO: 89.9 FL — SIGNIFICANT CHANGE UP (ref 80–100)
MCV RBC AUTO: 90.9 FL — SIGNIFICANT CHANGE UP (ref 80–100)
MONOCYTES # BLD AUTO: 0.51 K/UL — SIGNIFICANT CHANGE UP (ref 0–0.9)
MONOCYTES NFR BLD AUTO: 6.5 % — SIGNIFICANT CHANGE UP (ref 2–14)
NEUTROPHILS # BLD AUTO: 5.22 K/UL — SIGNIFICANT CHANGE UP (ref 1.8–7.4)
NEUTROPHILS NFR BLD AUTO: 66.6 % — SIGNIFICANT CHANGE UP (ref 43–77)
NRBC # FLD: 0 K/UL — SIGNIFICANT CHANGE UP (ref 0–0)
NRBC # FLD: 0 K/UL — SIGNIFICANT CHANGE UP (ref 0–0)
PLATELET # BLD AUTO: 184 K/UL — SIGNIFICANT CHANGE UP (ref 150–400)
PLATELET # BLD AUTO: 194 K/UL — SIGNIFICANT CHANGE UP (ref 150–400)
PMV BLD: 9.5 FL — SIGNIFICANT CHANGE UP (ref 7–13)
PMV BLD: 9.8 FL — SIGNIFICANT CHANGE UP (ref 7–13)
PROTHROM AB SERPL-ACNC: 11.1 SEC — SIGNIFICANT CHANGE UP (ref 9.8–13.1)
PROTHROM AB SERPL-ACNC: 11.6 SEC — SIGNIFICANT CHANGE UP (ref 9.8–13.1)
RBC # BLD FETUS AUTO: 0 — SIGNIFICANT CHANGE UP
RBC # BLD: 3.07 M/UL — LOW (ref 3.8–5.2)
RBC # BLD: 3.17 M/UL — LOW (ref 3.8–5.2)
RBC # FLD: 13.4 % — SIGNIFICANT CHANGE UP (ref 10.3–14.5)
RBC # FLD: 13.6 % — SIGNIFICANT CHANGE UP (ref 10.3–14.5)
T PALLIDUM AB TITR SER: NEGATIVE — SIGNIFICANT CHANGE UP
WBC # BLD: 7.84 K/UL — SIGNIFICANT CHANGE UP (ref 3.8–10.5)
WBC # BLD: 8.18 K/UL — SIGNIFICANT CHANGE UP (ref 3.8–10.5)
WBC # FLD AUTO: 7.84 K/UL — SIGNIFICANT CHANGE UP (ref 3.8–10.5)
WBC # FLD AUTO: 8.18 K/UL — SIGNIFICANT CHANGE UP (ref 3.8–10.5)

## 2020-03-13 RX ORDER — CALCIUM CARBONATE 500(1250)
2 TABLET ORAL DAILY
Refills: 0 | Status: DISCONTINUED | OUTPATIENT
Start: 2020-03-13 | End: 2020-03-14

## 2020-03-13 RX ADMIN — BUPRENORPHINE 1 MILLIGRAM(S): 10 PATCH, EXTENDED RELEASE TRANSDERMAL at 06:17

## 2020-03-13 RX ADMIN — BUPRENORPHINE 1 MILLIGRAM(S): 10 PATCH, EXTENDED RELEASE TRANSDERMAL at 06:50

## 2020-03-13 RX ADMIN — Medication 2 TABLET(S): at 22:21

## 2020-03-13 NOTE — DISCHARGE NOTE ANTEPARTUM - CARE PROVIDER_API CALL
Lizeth Acuna)  Obstetrics and Gynecology  [address] [place] [address]  Phone: [phone]  Fax: [phone]  Follow Up Time:

## 2020-03-13 NOTE — DISCHARGE NOTE ANTEPARTUM - HOSPITAL COURSE
39yo  at 32w1d a/w vaginal bleeding with concern for abruption. On admission SVE is closed/50/-3, SSE with small blood clot. BSS with CL of 3.1 to 2.1 with small funneling.  History of chronic opioid use continues home buprenorphine 1mg BID. Since admission pt has had no further bleeding, denies contractions, leaking fluid. Endorses good Fetal movement. Cat I fetal heart tracing. ATU sono 3/13:  Pt is stable for discharge home on HD ___ with close outpatient follow up in OB office within the week. 39yo  at 32w1d a/w vaginal bleeding with concern for abruption. On admission SVE is closed/50/-3, SSE with small blood clot. BSS with CL of 3.1 to 2.1 with small funneling.  History of chronic opioid use continues home buprenorphine 1mg BID. Since admission pt has had no further bleeding, denies contractions, leaking fluid. Endorses good Fetal movement. Cat I fetal heart tracing. ATU sono 3/13:  Pt is stable for discharge home on HD 2 with close outpatient follow up in OB office within the week.

## 2020-03-13 NOTE — PROGRESS NOTE ADULT - PROBLEM SELECTOR PLAN 1
Neuro: No analgesics required. Continues home buprenorphine 1mg BID.   CV: Hemodynamically stable. 2u PRBC on hold.   Pulm: O2 sat WNL on RA  GI: Remained NPO overnight, will consider advancing diet this morning.   : Voiding spontaneously, no vaginal bleeding   Heme: SCDs while in bed  ID: Afebrile, No signs of infection  Fetal Well Being: Continuous monitoring overnight, category I tracing.   Dispo: Continue to monitor for vaginal bleeding.     MARQUITA Mckeon, PGY-3

## 2020-03-13 NOTE — DISCHARGE NOTE ANTEPARTUM - PATIENT PORTAL LINK FT
You can access the FollowMyHealth Patient Portal offered by [facility] by registering at the following website: [email or web address]. By joining [employer]’s FollowMyHealth portal, you will also be able to view your health information using other applications (apps) compatible with our system.

## 2020-03-13 NOTE — PROGRESS NOTE ADULT - ATTENDING COMMENTS
Attending Notes  this AM, pt reports no bleeding or cramping   good FM   NST acontractile   hgb dropped slightly and fibrinogen but pt receiving hydration   will repaet labs at noon today   MFM scan   Anesthesia consult     Shira Navarro

## 2020-03-13 NOTE — PROGRESS NOTE ADULT - SUBJECTIVE AND OBJECTIVE BOX
R3 OB Antepartum Note  HD#2     Patient seen and examined at bedside, no acute overnight events. No acute complaints. States last vaginal bleeding was when she was first seen in triage, denies any bleeding or spotting since that time.     Pt reports +FM. Denies LOF, ctx, HA, epigastric pain, blurred vision, CP, SOB, N/V, fevers, and chills.      Vital Signs Last 24 Hours  T(C): 36.8 (03-13-20 @ 02:38), Max: 37.1 (03-12-20 @ 20:49)  HR: 70 (03-13-20 @ 03:22) (65 - 79)  BP: 100/58 (03-13-20 @ 02:38) (75/39 - 145/58)  RR: 17 (03-13-20 @ 02:38) (16 - 20)  SpO2: 100% (03-13-20 @ 03:22) (91% - 100%)    Physical Exam:  General: NAD  Abdomen: Soft, non-tender, gravid  Ext: No pain or swelling    FHT: 125/mod/accels+/decels-   [place]: rare contractions	      Labs:             10.3   12.53 )-----------( 221      ( 03-12 @ 17:33 )             30.3     PT/INR - ( 03-12 @ 18:30 )   PT: 11.6 SEC;   INR: 1.02     PTT - ( 03-12 @ 18:30 )  PTT:25.2 SEC    Uric Acid: (03-12 @ 18:30)  --       Fibrinogen: (03-12 @ 18:30)  527.0    LDH: (03-12 @ 18:30)  --         MEDICATIONS  (STANDING):  buprenorphine 1 milliGRAM(s) SubLingual <User Schedule>  lactated ringers Bolus 500 milliLiter(s) IV Bolus once  lactated ringers. 1000 milliLiter(s) (125 mL/Hr) IV Continuous <Continuous>    MEDICATIONS  (PRN):

## 2020-03-13 NOTE — DISCHARGE NOTE ANTEPARTUM - PLAN OF CARE
Recovery/ continue prenatal course - Follow up with OB within one week  - Return with vaginal bleeding, leaking fluid, contractions or decreased fetal movement  - Monitor for fetal kick counts, if decreased return to hospital

## 2020-03-13 NOTE — DISCHARGE NOTE ANTEPARTUM - MEDICATION SUMMARY - MEDICATIONS TO TAKE
I will START or STAY ON the medications listed below when I get home from the hospital:    buprenorphine 2 mg sublingual tablet  -- 0.5 tab(s) under tongue once a day  -- Indication: For home med    Prenatal 1 oral capsule  -- Indication: For Prenatal vitamin

## 2020-03-14 VITALS
DIASTOLIC BLOOD PRESSURE: 63 MMHG | RESPIRATION RATE: 16 BRPM | SYSTOLIC BLOOD PRESSURE: 133 MMHG | HEART RATE: 83 BPM | OXYGEN SATURATION: 100 % | TEMPERATURE: 99 F

## 2020-03-14 LAB
BASOPHILS # BLD AUTO: 0.04 K/UL — SIGNIFICANT CHANGE UP (ref 0–0.2)
BASOPHILS NFR BLD AUTO: 0.4 % — SIGNIFICANT CHANGE UP (ref 0–2)
EOSINOPHIL # BLD AUTO: 0.08 K/UL — SIGNIFICANT CHANGE UP (ref 0–0.5)
EOSINOPHIL NFR BLD AUTO: 0.9 % — SIGNIFICANT CHANGE UP (ref 0–6)
HCT VFR BLD CALC: 29.7 % — LOW (ref 34.5–45)
HGB BLD-MCNC: 9.7 G/DL — LOW (ref 11.5–15.5)
IMM GRANULOCYTES NFR BLD AUTO: 1.8 % — HIGH (ref 0–1.5)
LYMPHOCYTES # BLD AUTO: 2.13 K/UL — SIGNIFICANT CHANGE UP (ref 1–3.3)
LYMPHOCYTES # BLD AUTO: 23.8 % — SIGNIFICANT CHANGE UP (ref 13–44)
MCHC RBC-ENTMCNC: 30.1 PG — SIGNIFICANT CHANGE UP (ref 27–34)
MCHC RBC-ENTMCNC: 32.7 % — SIGNIFICANT CHANGE UP (ref 32–36)
MCV RBC AUTO: 92.2 FL — SIGNIFICANT CHANGE UP (ref 80–100)
MONOCYTES # BLD AUTO: 0.79 K/UL — SIGNIFICANT CHANGE UP (ref 0–0.9)
MONOCYTES NFR BLD AUTO: 8.8 % — SIGNIFICANT CHANGE UP (ref 2–14)
NEUTROPHILS # BLD AUTO: 5.75 K/UL — SIGNIFICANT CHANGE UP (ref 1.8–7.4)
NEUTROPHILS NFR BLD AUTO: 64.3 % — SIGNIFICANT CHANGE UP (ref 43–77)
NRBC # FLD: 0 K/UL — SIGNIFICANT CHANGE UP (ref 0–0)
PLATELET # BLD AUTO: 188 K/UL — SIGNIFICANT CHANGE UP (ref 150–400)
PMV BLD: 9.8 FL — SIGNIFICANT CHANGE UP (ref 7–13)
RBC # BLD: 3.22 M/UL — LOW (ref 3.8–5.2)
RBC # FLD: 13.6 % — SIGNIFICANT CHANGE UP (ref 10.3–14.5)
WBC # BLD: 8.95 K/UL — SIGNIFICANT CHANGE UP (ref 3.8–10.5)
WBC # FLD AUTO: 8.95 K/UL — SIGNIFICANT CHANGE UP (ref 3.8–10.5)

## 2020-03-14 PROCEDURE — 76819 FETAL BIOPHYS PROFIL W/O NST: CPT | Mod: 26

## 2020-03-14 PROCEDURE — 99232 SBSQ HOSP IP/OBS MODERATE 35: CPT

## 2020-03-14 RX ADMIN — BUPRENORPHINE 1 MILLIGRAM(S): 10 PATCH, EXTENDED RELEASE TRANSDERMAL at 05:12

## 2020-03-14 RX ADMIN — BUPRENORPHINE 1 MILLIGRAM(S): 10 PATCH, EXTENDED RELEASE TRANSDERMAL at 05:14

## 2020-03-14 NOTE — PROGRESS NOTE ADULT - ATTENDING COMMENTS
41yo  at 32w2d a/w vaginal bleeding with concern for abruption, currently without bleeding  today non reactive NST. bedside BPP . repeat NST reactive  known fetal enlarged/cystic right kidney  s/p BMZ 3/3-3/4 (admission for r/o PTL)  would continue to monitor inhouse at this time

## 2020-03-14 NOTE — PROGRESS NOTE ADULT - SUBJECTIVE AND OBJECTIVE BOX
called to  see pt by nurse  Pt very upset that she was recommended to stay until tomorrow,  Reviewed with pt that [facility] recommended continued observation until tomorrow.  Pt says she has not bled since she has been here.    Will confer with covering Perinatologist again.

## 2020-03-14 NOTE — CHART NOTE - NSCHARTNOTEFT_GEN_A_CORE
R3 ANTEPARTUM CHART NOTE    Backnote due to clinical duties.    Pt seen and evaluated at bedside w/ Dr. Leal.  BPP performed due to minimal variability on NST.  BPP 8/8, vtx, ant plac, mvp >2.  Repeat NST shows moderate variability, cat I.      d/w Dr. Benitez and Dr. Leal.  ELLEN Worrell MD PGY3

## 2020-03-14 NOTE — PROGRESS NOTE ADULT - SUBJECTIVE AND OBJECTIVE BOX
R3 OB Note     Patient seen and examined at bedside, no acute overnight events. No acute complaints.     Pt reports +FM. Denies LOF, VB, ctx, HA, epigastric pain, blurred vision, CP, SOB, N/V, fevers, and chills.    Vital Signs Last 24 Hours  T(C): 36.8 (03-13-20 @ 22:23), Max: 37 (03-13-20 @ 18:05)  HR: 72 (03-13-20 @ 22:23) (69 - 82)  BP: 102/54 (03-13-20 @ 22:23) (80/47 - 111/65)  RR: 16 (03-13-20 @ 22:23) (16 - 17)  SpO2: 98% (03-13-20 @ 22:23) (97% - 100%)      Physical Exam:  General: NAD  CV: RRR  Pulm: CTAB   Abdomen: Soft, non-tender, gravid  Ext: No pain or swelling    Labs:             9.5    7.84  )-----------( 194      ( 03-13 @ 11:55 )             28.8           PT/INR - ( 03-13 @ 11:55 )   PT: 11.6 SEC;   INR: 1.02     PTT - ( 03-13 @ 11:55 )  PTT:25.6 SEC    Uric Acid: (03-13 @ 11:55)  --       Fibrinogen: (03-13 @ 11:55)  488.0    LDH: (03-13 @ 11:55)  --         MEDICATIONS  (STANDING):  buprenorphine 1 milliGRAM(s) SubLingual <User Schedule>  prenatal multivitamin 1 Tablet(s) Oral daily    MEDICATIONS  (PRN):  calcium carbonate    500 mG (Tums) Chewable 2 Tablet(s) Chew daily PRN Heartburn

## 2020-03-14 NOTE — PROGRESS NOTE ADULT - PROBLEM SELECTOR PLAN 1
Neuro: No analgesics required. Continues home buprenorphine 1mg BID.   CV: Hemodynamically stable. 2u PRBC on hold.   Pulm: O2 sat WNL on RA  GI: Regular diet  : Voiding spontaneously, no vaginal bleeding   Heme: SCDs while in bed  ID: Afebrile, No signs of infection  Fetal Well Being: NST BID. S/p BMZ 3/3-3/4.   Dispo: Continue to monitor for vaginal bleeding, discharge planning.     MARQUITA Mckeon, PGY-3

## 2020-03-19 NOTE — ED ADULT TRIAGE NOTE - CCCP TRG CHIEF CMPLNT
[] : No SOB [No] : No [Change in mental status noted] : No change in mental status noted [None] : None

## 2020-04-07 PROBLEM — F11.20 OPIOID DEPENDENCE, UNCOMPLICATED: Chronic | Status: ACTIVE | Noted: 2020-03-12

## 2020-04-07 PROBLEM — Z87.891 PERSONAL HISTORY OF NICOTINE DEPENDENCE: Chronic | Status: ACTIVE | Noted: 2020-03-12

## 2020-04-21 ENCOUNTER — ASOB RESULT (OUTPATIENT)
Age: 40
End: 2020-04-21

## 2020-04-21 ENCOUNTER — APPOINTMENT (OUTPATIENT)
Dept: ANTEPARTUM | Facility: CLINIC | Age: 40
End: 2020-04-21
Payer: COMMERCIAL

## 2020-04-21 PROCEDURE — 76818 FETAL BIOPHYS PROFILE W/NST: CPT | Mod: 26

## 2020-04-21 PROCEDURE — 76816 OB US FOLLOW-UP PER FETUS: CPT

## 2020-04-21 PROCEDURE — 99241 OFFICE CONSULTATION NEW/ESTAB PATIENT 15 MIN: CPT | Mod: 25

## 2020-04-23 ENCOUNTER — TRANSCRIPTION ENCOUNTER (OUTPATIENT)
Age: 40
End: 2020-04-23

## 2020-04-23 ENCOUNTER — INPATIENT (INPATIENT)
Facility: HOSPITAL | Age: 40
LOS: 2 days | Discharge: HOME | End: 2020-04-26
Attending: OBSTETRICS & GYNECOLOGY | Admitting: OBSTETRICS & GYNECOLOGY

## 2020-04-23 VITALS
TEMPERATURE: 98 F | HEART RATE: 67 BPM | RESPIRATION RATE: 18 BRPM | SYSTOLIC BLOOD PRESSURE: 109 MMHG | DIASTOLIC BLOOD PRESSURE: 68 MMHG

## 2020-04-23 DIAGNOSIS — Z3A.00 WEEKS OF GESTATION OF PREGNANCY NOT SPECIFIED: ICD-10-CM

## 2020-04-23 DIAGNOSIS — O26.899 OTHER SPECIFIED PREGNANCY RELATED CONDITIONS, UNSPECIFIED TRIMESTER: ICD-10-CM

## 2020-04-23 DIAGNOSIS — Z98.89 OTHER SPECIFIED POSTPROCEDURAL STATES: Chronic | ICD-10-CM

## 2020-04-23 DIAGNOSIS — G56.00 CARPAL TUNNEL SYNDROME, UNSPECIFIED UPPER LIMB: Chronic | ICD-10-CM

## 2020-04-23 RX ORDER — FAMOTIDINE 10 MG/ML
20 INJECTION INTRAVENOUS ONCE
Refills: 0 | Status: COMPLETED | OUTPATIENT
Start: 2020-04-23 | End: 2020-04-23

## 2020-04-23 RX ORDER — METOCLOPRAMIDE HCL 10 MG
10 TABLET ORAL ONCE
Refills: 0 | Status: COMPLETED | OUTPATIENT
Start: 2020-04-23 | End: 2020-04-23

## 2020-04-23 RX ORDER — SODIUM CHLORIDE 9 MG/ML
1000 INJECTION, SOLUTION INTRAVENOUS
Refills: 0 | Status: DISCONTINUED | OUTPATIENT
Start: 2020-04-23 | End: 2020-04-23

## 2020-04-23 RX ORDER — OXYTOCIN 10 UNIT/ML
333.33 VIAL (ML) INJECTION
Qty: 20 | Refills: 0 | Status: DISCONTINUED | OUTPATIENT
Start: 2020-04-23 | End: 2020-04-23

## 2020-04-23 RX ORDER — SODIUM CHLORIDE 9 MG/ML
1000 INJECTION, SOLUTION INTRAVENOUS ONCE
Refills: 0 | Status: DISCONTINUED | OUTPATIENT
Start: 2020-04-23 | End: 2020-04-23

## 2020-04-23 RX ORDER — CITRIC ACID/SODIUM CITRATE 300-500 MG
30 SOLUTION, ORAL ORAL ONCE
Refills: 0 | Status: COMPLETED | OUTPATIENT
Start: 2020-04-23 | End: 2020-04-23

## 2020-04-23 RX ADMIN — FAMOTIDINE 20 MILLIGRAM(S): 10 INJECTION INTRAVENOUS at 23:32

## 2020-04-23 RX ADMIN — Medication 30 MILLILITER(S): at 23:32

## 2020-04-23 RX ADMIN — Medication 10 MILLIGRAM(S): at 23:32
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
## 2020-04-23 NOTE — OB RN PATIENT PROFILE - TEACHING/LEARNING FACTORS IMPACT ABILITY TO LEARN
Labs at [facility] 3rd floor room 309    Your appointment with Dr. Vazquez 2/5/20 at 1015 am      Your next appointment with Dr. Laguerre is 2/27/20 1020 am  
none

## 2020-04-23 NOTE — OB RN PATIENT PROFILE - TEMPERATURE IN CELSIUS (DEGREES C)
Subjective:   45 y.o. female for Well Woman Check. Her gyne and breast care is done elsewhere by her Ob-Gyne physician. Doing well; No new complaints. Patient Active Problem List    Diagnosis Date Noted    Multiple thyroid nodules 01/11/2016    Nausea     Facet arthropathy     Heart murmur     Iritis      Current Outpatient Medications   Medication Sig Dispense Refill    hydroCHLOROthiazide (HYDRODIURIL) 25 mg tablet Take 1 Tab by mouth daily. 30 Tab 6    simvastatin (ZOCOR) 40 mg tablet take 1 tablet by mouth every evening 30 Tab 6    medroxyprogesterone (DEPO-PROVERA) 150 mg/mL Syrg 150 mg by IntraMUSCular route once.  MULTIVITAMINS (MULTI-VITAMIN PO) Take  by mouth. No Known Allergies  Past Medical History:   Diagnosis Date    Facet arthropathy     Heart murmur 2006    High cholesterol     Iritis     Low back pain     Nausea     Spinal stenosis      Past Surgical History:   Procedure Laterality Date    HX ORTHOPAEDIC  2009    back surgery     Family History   Problem Relation Age of Onset    Diabetes Mother     Heart Disease Father         CAD     Social History     Tobacco Use    Smoking status: Never Smoker    Smokeless tobacco: Never Used   Substance Use Topics    Alcohol use: No        Lab Results   Component Value Date/Time    WBC 7.9 09/13/2011 08:43 AM    HGB 11.9 09/13/2011 08:43 AM    HCT 38.3 09/13/2011 08:43 AM    PLATELET 975 01/37/3863 08:43 AM    MCV 91 09/13/2011 08:43 AM     Lab Results   Component Value Date/Time    Glucose 85 01/21/2020 11:20 AM    LDL, calculated 152 (H) 01/21/2020 11:20 AM    Creatinine 0.82 01/21/2020 11:20 AM      Lab Results   Component Value Date/Time    Cholesterol, total 209 (H) 01/21/2020 11:20 AM    HDL Cholesterol 44 01/21/2020 11:20 AM    LDL, calculated 152 (H) 01/21/2020 11:20 AM    Triglyceride 65 01/21/2020 11:20 AM    CHOL/HDL Ratio 4.8 01/21/2020 11:20 AM        ROS: Feeling generally well.  No TIA's or unusual headaches, no dysphagia. No prolonged cough. No dyspnea or chest pain on exertion. No abdominal pain, change in bowel habits, black or bloody stools. No urinary tract symptoms. No new or unusual musculoskeletal symptoms. Specific concerns today: none doing well. .gyn stated she has a  \" 1+\"  In a recent urine sample she gave there. She is not completely sure what this is referencing. It  May have been 1+ leucocytes. Will check a urine today    Objective: The patient appears well, alert, oriented x 3, in no distress. Visit Vitals  /80   Pulse 98   Temp 98.6 °F (37 °C) (Oral)   Resp 18   Ht 5' 6\" (1.676 m)   Wt 184 lb 9.6 oz (83.7 kg)   SpO2 98%   BMI 29.80 kg/m²     ENT normal.  Neck supple. No adenopathy or thyromegaly. JUDE. Lungs are clear, good air entry, no wheezes, rhonchi or rales. S1 and S2 normal, no murmurs, regular rate and rhythm. Abdomen soft without tenderness, guarding, mass or organomegaly. Extremities show no edema, normal peripheral pulses. Neurological is normal, no focal findings. Breast and Pelvic exams are deferred. UA noted;     Assessment/Plan:   Well Woman      ICD-10-CM ICD-9-CM    1. Well woman exam (no gynecological exam) Z00.00 V70.0    2. Hypercholesteremia- for lab only E78.00 272.0 LIPID PANEL      AST      ALT   3. Essential hypertension- for lab only B16 261.5 METABOLIC PANEL, BASIC   4. Pyuria- new R82.81 791.9 URINALYSIS W/ RFLX MICROSCOPIC   5. Screening for cardiovascular condition Z13.6 V81.2            As above,   above all stable unless otherwise noted   treatment plan as listed below  Orders Placed This Encounter    URINALYSIS W/ RFLX MICROSCOPIC    LIPID PANEL    METABOLIC PANEL, BASIC    AST    ALT     Follow-up and Dispositions    · Return in about 6 months (around 7/21/2020) for htn. An After Visit Summary was printed and given to the patient. This has been fully explained to the patient, who indicates understanding. 36.9

## 2020-04-23 NOTE — OB PROVIDER TRIAGE NOTE - NS_OBGYNHISTORY_OBGYN_ALL_OB_FT
obhx: 5/29/2014-Primary c/s @ 41+1 wks for arrest of descent. Male, 8.3#  2012-Early complete SAB  gynhx: denies

## 2020-04-23 NOTE — OB RN TRIAGE NOTE - CHIEF COMPLAINT QUOTE
contractions  scheduled repeat c/s 5/1  admitted x 2 s/p bam 3/4  s/p bam 3/4 contractions  scheduled repeat c/s 5/1  admitted x 2 s/p bam 3/4

## 2020-04-23 NOTE — OB PROVIDER H&P - HISTORY OF PRESENT ILLNESS
's patient is a 39 y/o  female @ 38 EGA presenting with complaints abdominal contractions since 1400 today.  Patient reports contractions are presently q2 mins 10/10 pain and is requesting pain management.  Patient reports positive fetal movement, denies leakage of fluid denies vaginal bleeding.  patient was seen in office today and was examined and noted to be 0.5cm.    patient does not desire TOLAC     AP complications:   3/10/20: seen in D&T brown vaginal discharge, quarter-size clot removed, pedunculated cervical polyp at the os, cervical length 1.5-1.6 cm  3/10/20 TAS: cephalic, anterior placenta  BPP, GALILEO 18, EFW 1776  3/3/20-3/6/20: s/p Betamethasone, Ampicillin, Magnesium,   shortened cervix of 1.7 cm, FFN positive  3/20/20; patient admitted for vaginal bleeding; rule out placenta abruption     Fetal Alert: Unilateral cystic kidney. The right kidney is enlarged (length 5.7 cm) with abnormal architecture-no pelvis, multiple cysts of different sizes, echogenic stroma. Eposure to maternal use of Buprenorphine during pregnancy.    Allergy: NKDA  Meds: Prenatal vitamins    PMH: Carpel Tunnel; Chronic back pain  PSH: D/C ('14); Carpel tunnel Sx ('09)  OBgynHx    2014-Primary c/s @ 41+1 wks for arrest of descent. Male, 8.3#  -Early complete SAB  Pt denies any h/o: Abn PAP, ovarian cysts, fibroids, breast problems, STDs/STIs  PSY: Anxiety disorder, no meds/no therapy  EtOH/ Smoke/ Recreational substance use: Denies  FH: Hypertension/Stroke (Father/)  H/W/BMI: 62"/119#/21.8 's patient is a 41 y/o  female @ 38 EGA presenting with complaints abdominal contractions since 1400 today.  Patient reports contractions are presently q2 mins 10/10 pain and is requesting pain management.  Patient reports positive fetal movement, denies leakage of fluid denies vaginal bleeding.  patient was seen in office today and was examined and noted to be 0.5cm.    patient does not desire TOLAC     AP complications:   3/10/20: seen in D&T brown vaginal discharge, quarter-size clot removed, pedunculated cervical polyp at the os, cervical length 1.5-1.6 cm  3/10/20 TAS: cephalic, anterior placenta  BPP, GALILEO 18, EFW 1776  3/3/20-3/6/20: s/p Betamethasone, Ampicillin, Magnesium,   shortened cervix of 1.7 cm, FFN positive  3/20/20; patient admitted for vaginal bleeding; rule out placenta abruption     Fetal Alert: Unilateral cystic kidney. The right kidney is enlarged (length 5.7 cm) with abnormal architecture-no pelvis, multiple cysts of different sizes, echogenic stroma. Exposure to maternal use of Buprenorphine during pregnancy.    Allergy: NKDA  Meds: Prenatal vitamins    PMH: Carpel Tunnel; Chronic back pain  PSH: D/C ('14); Carpel tunnel Sx ('09)  OBgynHx    2014-Primary c/s @ 41+1 wks for arrest of descent. Male, 8.3#  -Early complete SAB  Pt denies any h/o: Abn PAP, ovarian cysts, fibroids, breast problems, STDs/STIs  PSY: Anxiety disorder, no meds/no therapy  EtOH/ Smoke/ Recreational substance use: Denies  FH: Hypertension/Stroke (Father/)  H/W/BMI: 62"/119#/21.8

## 2020-04-23 NOTE — OB PROVIDER TRIAGE NOTE - PSH
Pt presents with severe oral dysphagia impacted by decreased cognition with oral holding resulting in ST suctioning of oral cavity. Pt could not initiate oral prep/oral stage of swallow therefore unable to assess pharyngeal stage of swallow. Carpal tunnel syndrome  both wrist, surgery R  S/P   14 M 8#3, arrest of descent

## 2020-04-23 NOTE — OB PROVIDER H&P - ATTENDING COMMENTS
41 y/o  female @ 38 EGA with contractions are presently q2 mins 10/10 pain and is requesting pain management.  Patient admitted with advanced exam- active labor at 6cm dilated. She declines trial of labor as prior  section for arrest of descend   admit for repeat  section. Patient hx pertinent for disc disease s/p accident and will discuss with anesthesia plan for general vs regional anesthesia 39 y/o  female @ 38 EGA with contractions are presently q2 mins 10/10 pain and is requesting pain management.  Patient admitted with advanced exam- active labor at 6cm dilated. She declines trial of labor as prior  section for arrest of descend   admit for repeat  section. Patient hx pertinent for disc disease s/p accident and will discuss with anesthesia plan for general vs regional anesthesia- patient stating wants a general anesthesia

## 2020-04-23 NOTE — OB PROVIDER TRIAGE NOTE - NSHPPHYSICALEXAM_GEN_ALL_CORE
Vital Signs Last 24 Hrs  T(C): 36.9 (23 Apr 2020 22:52), Max: 36.9 (23 Apr 2020 22:52)  T(F): 98.4 (23 Apr 2020 22:52), Max: 98.4 (23 Apr 2020 22:52)  HR: 67 (23 Apr 2020 22:57) (67 - 67)  BP: 109/68 (23 Apr 2020 22:57) (109/68 - 109/68)  BP(mean): --  RR: 18 (23 Apr 2020 22:52) (18 - 18)  SpO2: --    regular heart rate; rhythm   lungs CTA     abdomen soft nontender gravid  (+) FHR; moderate variability; with accelerations  irregular uterine contractions noted on tocometer    Vaginal Exam: 4.5/70/-3    Cephalic presentation on sonogram

## 2020-04-23 NOTE — OB RN TRIAGE NOTE - NSSUBSTANCEUSE_GEN_ALL_CORE_SD
never used h/o prescription pain med abuse patient wouldn't state what medication/street drug/inhalant/medication abuse/never used

## 2020-04-23 NOTE — OB RN PATIENT PROFILE - NSSUBSTANCEUSE_GEN_ALL_CORE_SD
never used/street drug/inhalant/medication abuse/h/o prescription pain med abuse patient wouldn't state what medication

## 2020-04-23 NOTE — OB PROVIDER TRIAGE NOTE - HISTORY OF PRESENT ILLNESS
's patient is a 41 y/o  female @ 38 EGA presenting with complaints abdominal contractions since 1400 today.  Patient reports contractions are presently q2 mins 10/10 pain and is requesting pain management.  Patient reports positive fetal movement, denies leakage of fluid denies vaginal bleeding.  patient was seen in office today and was examined and noted to be 0.5cm.    AP complications:   3/10/20: seen in D&T brown vaginal discharge, quarter-size clot removed, pedunculated cervical polyp at the os, cervical length 1.5-1.6 cm  3/10/20 TAS: cephalic, anterior placena,  BPP, GALILEO 18, EFW 1776  3/3/20-3/6/20: s/p Betamethasone, Ampicillin, Magnesium,   shortened cervix of 1.7 cm, FFN positive    Fetal Alert: Unilateral cystic kidney. The right kidney is enlarged (length 5.7 cm) with abnormal architecture-no pelvis, multiple cysts of different sizes, echogenic stroma. Eposure to maternal use of Buprenorphine during pregnancy.    Allergy: NKDA  Meds: Prenatal vitamins; Buprenorphine  PMH: Carpel Tunnel; Chronic back pain  PSH: D/C ('14); Carpel tunnel Sx ('09)  OBgynHx    2014-Primary c/s @ 41+1 wks for arrest of descent. Male, 8.3#  -Early complete SAB  Pt denies any h/o: Abn PAP, ovarian cysts, fibroids, breast problems, STDs/STIs  PSY: Anxiety disorder, no meds/no therapy  EtOH/ Smoke/ Recreational substance use: Denies  FH: Hypertension/Stroke (Father/)  H/W/BMI: 62"/119#/21.8

## 2020-04-24 ENCOUNTER — TRANSCRIPTION ENCOUNTER (OUTPATIENT)
Age: 40
End: 2020-04-24

## 2020-04-24 LAB
ALBUMIN SERPL ELPH-MCNC: 2.8 G/DL — LOW (ref 3.3–5)
ALP SERPL-CCNC: 79 U/L — SIGNIFICANT CHANGE UP (ref 40–120)
ALT FLD-CCNC: 9 U/L — SIGNIFICANT CHANGE UP (ref 4–33)
ANION GAP SERPL CALC-SCNC: 11 MMO/L — SIGNIFICANT CHANGE UP (ref 7–14)
APTT BLD: 26.2 SEC — LOW (ref 27.5–36.3)
AST SERPL-CCNC: 15 U/L — SIGNIFICANT CHANGE UP (ref 4–32)
BASOPHILS # BLD AUTO: 0.02 K/UL — SIGNIFICANT CHANGE UP (ref 0–0.2)
BASOPHILS NFR BLD AUTO: 0.1 % — SIGNIFICANT CHANGE UP (ref 0–2)
BASOPHILS NFR SPEC: 0 % — SIGNIFICANT CHANGE UP (ref 0–2)
BILIRUB SERPL-MCNC: 0.3 MG/DL — SIGNIFICANT CHANGE UP (ref 0.2–1.2)
BLASTS # FLD: 0 % — SIGNIFICANT CHANGE UP (ref 0–0)
BLD GP AB SCN SERPL QL: NEGATIVE — SIGNIFICANT CHANGE UP
BUN SERPL-MCNC: 8 MG/DL — SIGNIFICANT CHANGE UP (ref 7–23)
CALCIUM SERPL-MCNC: 8.5 MG/DL — SIGNIFICANT CHANGE UP (ref 8.4–10.5)
CHLORIDE SERPL-SCNC: 102 MMOL/L — SIGNIFICANT CHANGE UP (ref 98–107)
CO2 SERPL-SCNC: 20 MMOL/L — LOW (ref 22–31)
CREAT SERPL-MCNC: 0.44 MG/DL — LOW (ref 0.5–1.3)
EOSINOPHIL # BLD AUTO: 0.01 K/UL — SIGNIFICANT CHANGE UP (ref 0–0.5)
EOSINOPHIL NFR BLD AUTO: 0.1 % — SIGNIFICANT CHANGE UP (ref 0–6)
EOSINOPHIL NFR FLD: 0 % — SIGNIFICANT CHANGE UP (ref 0–6)
FIBRINOGEN PPP-MCNC: 501 MG/DL — SIGNIFICANT CHANGE UP (ref 300–520)
GIANT PLATELETS BLD QL SMEAR: PRESENT — SIGNIFICANT CHANGE UP
GLUCOSE SERPL-MCNC: 119 MG/DL — HIGH (ref 70–99)
HCT VFR BLD CALC: 28.6 % — LOW (ref 34.5–45)
HGB BLD-MCNC: 9.8 G/DL — LOW (ref 11.5–15.5)
IMM GRANULOCYTES NFR BLD AUTO: 0.9 % — SIGNIFICANT CHANGE UP (ref 0–1.5)
INR BLD: 0.98 — SIGNIFICANT CHANGE UP (ref 0.88–1.17)
LYMPHOCYTES # BLD AUTO: 1.04 K/UL — SIGNIFICANT CHANGE UP (ref 1–3.3)
LYMPHOCYTES # BLD AUTO: 5.7 % — LOW (ref 13–44)
LYMPHOCYTES NFR SPEC AUTO: 0.9 % — LOW (ref 13–44)
MCHC RBC-ENTMCNC: 30.2 PG — SIGNIFICANT CHANGE UP (ref 27–34)
MCHC RBC-ENTMCNC: 34.3 % — SIGNIFICANT CHANGE UP (ref 32–36)
MCV RBC AUTO: 88.3 FL — SIGNIFICANT CHANGE UP (ref 80–100)
METAMYELOCYTES # FLD: 0 % — SIGNIFICANT CHANGE UP (ref 0–1)
MONOCYTES # BLD AUTO: 1 K/UL — HIGH (ref 0–0.9)
MONOCYTES NFR BLD AUTO: 5.5 % — SIGNIFICANT CHANGE UP (ref 2–14)
MONOCYTES NFR BLD: 0.9 % — LOW (ref 2–9)
MYELOCYTES NFR BLD: 0 % — SIGNIFICANT CHANGE UP (ref 0–0)
NEUTROPHIL AB SER-ACNC: 95.6 % — HIGH (ref 43–77)
NEUTROPHILS # BLD AUTO: 16.07 K/UL — HIGH (ref 1.8–7.4)
NEUTROPHILS NFR BLD AUTO: 87.7 % — HIGH (ref 43–77)
NEUTS BAND # BLD: 1.7 % — SIGNIFICANT CHANGE UP (ref 0–6)
NRBC # FLD: 0 K/UL — SIGNIFICANT CHANGE UP (ref 0–0)
OTHER - HEMATOLOGY %: 0 — SIGNIFICANT CHANGE UP
PLATELET # BLD AUTO: 181 K/UL — SIGNIFICANT CHANGE UP (ref 150–400)
PLATELET COUNT - ESTIMATE: NORMAL — SIGNIFICANT CHANGE UP
PMV BLD: 9.8 FL — SIGNIFICANT CHANGE UP (ref 7–13)
POTASSIUM SERPL-MCNC: 3.7 MMOL/L — SIGNIFICANT CHANGE UP (ref 3.5–5.3)
POTASSIUM SERPL-SCNC: 3.7 MMOL/L — SIGNIFICANT CHANGE UP (ref 3.5–5.3)
PROMYELOCYTES # FLD: 0 % — SIGNIFICANT CHANGE UP (ref 0–0)
PROT SERPL-MCNC: 5.3 G/DL — LOW (ref 6–8.3)
PROTHROM AB SERPL-ACNC: 11.2 SEC — SIGNIFICANT CHANGE UP (ref 9.8–13.1)
RBC # BLD: 3.24 M/UL — LOW (ref 3.8–5.2)
RBC # FLD: 13.3 % — SIGNIFICANT CHANGE UP (ref 10.3–14.5)
RH IG SCN BLD-IMP: POSITIVE — SIGNIFICANT CHANGE UP
RH IG SCN BLD-IMP: POSITIVE — SIGNIFICANT CHANGE UP
SARS-COV-2 RNA SPEC QL NAA+PROBE: SIGNIFICANT CHANGE UP
SODIUM SERPL-SCNC: 133 MMOL/L — LOW (ref 135–145)
T PALLIDUM AB TITR SER: NEGATIVE — SIGNIFICANT CHANGE UP
VARIANT LYMPHS # BLD: 0.9 % — SIGNIFICANT CHANGE UP
WBC # BLD: 18.3 K/UL — HIGH (ref 3.8–10.5)
WBC # FLD AUTO: 18.3 K/UL — HIGH (ref 3.8–10.5)

## 2020-04-24 RX ORDER — HYDROMORPHONE HYDROCHLORIDE 2 MG/ML
2 INJECTION INTRAMUSCULAR; INTRAVENOUS; SUBCUTANEOUS
Refills: 0 | Status: DISCONTINUED | OUTPATIENT
Start: 2020-04-24 | End: 2020-04-25

## 2020-04-24 RX ORDER — SODIUM CHLORIDE 9 MG/ML
1000 INJECTION, SOLUTION INTRAVENOUS ONCE
Refills: 0 | Status: COMPLETED | OUTPATIENT
Start: 2020-04-24 | End: 2020-04-24

## 2020-04-24 RX ORDER — ACETAMINOPHEN 500 MG
975 TABLET ORAL
Refills: 0 | Status: DISCONTINUED | OUTPATIENT
Start: 2020-04-24 | End: 2020-04-26

## 2020-04-24 RX ORDER — BUPRENORPHINE 10 UG/H
0.5 PATCH, EXTENDED RELEASE TRANSDERMAL
Qty: 0 | Refills: 0 | DISCHARGE

## 2020-04-24 RX ORDER — OXYTOCIN 10 UNIT/ML
333.33 VIAL (ML) INJECTION
Qty: 20 | Refills: 0 | Status: DISCONTINUED | OUTPATIENT
Start: 2020-04-24 | End: 2020-04-25

## 2020-04-24 RX ORDER — HYDROMORPHONE HYDROCHLORIDE 2 MG/ML
0.5 INJECTION INTRAMUSCULAR; INTRAVENOUS; SUBCUTANEOUS
Refills: 0 | Status: DISCONTINUED | OUTPATIENT
Start: 2020-04-24 | End: 2020-04-25

## 2020-04-24 RX ORDER — HYDROMORPHONE HYDROCHLORIDE 2 MG/ML
1 INJECTION INTRAMUSCULAR; INTRAVENOUS; SUBCUTANEOUS
Refills: 0 | Status: DISCONTINUED | OUTPATIENT
Start: 2020-04-24 | End: 2020-04-25

## 2020-04-24 RX ORDER — SODIUM CHLORIDE 9 MG/ML
1000 INJECTION, SOLUTION INTRAVENOUS
Refills: 0 | Status: DISCONTINUED | OUTPATIENT
Start: 2020-04-24 | End: 2020-04-25

## 2020-04-24 RX ORDER — ONDANSETRON 8 MG/1
4 TABLET, FILM COATED ORAL EVERY 6 HOURS
Refills: 0 | Status: DISCONTINUED | OUTPATIENT
Start: 2020-04-24 | End: 2020-04-25

## 2020-04-24 RX ORDER — IBUPROFEN 200 MG
1 TABLET ORAL
Qty: 0 | Refills: 0 | DISCHARGE
Start: 2020-04-24

## 2020-04-24 RX ORDER — SIMETHICONE 80 MG/1
80 TABLET, CHEWABLE ORAL EVERY 4 HOURS
Refills: 0 | Status: DISCONTINUED | OUTPATIENT
Start: 2020-04-24 | End: 2020-04-26

## 2020-04-24 RX ORDER — HYDROMORPHONE HYDROCHLORIDE 2 MG/ML
30 INJECTION INTRAMUSCULAR; INTRAVENOUS; SUBCUTANEOUS
Refills: 0 | Status: DISCONTINUED | OUTPATIENT
Start: 2020-04-24 | End: 2020-04-25

## 2020-04-24 RX ORDER — HEPARIN SODIUM 5000 [USP'U]/ML
5000 INJECTION INTRAVENOUS; SUBCUTANEOUS EVERY 12 HOURS
Refills: 0 | Status: DISCONTINUED | OUTPATIENT
Start: 2020-04-24 | End: 2020-04-26

## 2020-04-24 RX ORDER — NALOXONE HYDROCHLORIDE 4 MG/.1ML
0.1 SPRAY NASAL
Refills: 0 | Status: DISCONTINUED | OUTPATIENT
Start: 2020-04-24 | End: 2020-04-25

## 2020-04-24 RX ORDER — LANOLIN
1 OINTMENT (GRAM) TOPICAL EVERY 6 HOURS
Refills: 0 | Status: DISCONTINUED | OUTPATIENT
Start: 2020-04-24 | End: 2020-04-26

## 2020-04-24 RX ORDER — DIPHENHYDRAMINE HCL 50 MG
25 CAPSULE ORAL EVERY 6 HOURS
Refills: 0 | Status: DISCONTINUED | OUTPATIENT
Start: 2020-04-24 | End: 2020-04-26

## 2020-04-24 RX ORDER — TETANUS TOXOID, REDUCED DIPHTHERIA TOXOID AND ACELLULAR PERTUSSIS VACCINE, ADSORBED 5; 2.5; 8; 8; 2.5 [IU]/.5ML; [IU]/.5ML; UG/.5ML; UG/.5ML; UG/.5ML
0.5 SUSPENSION INTRAMUSCULAR ONCE
Refills: 0 | Status: DISCONTINUED | OUTPATIENT
Start: 2020-04-24 | End: 2020-04-26

## 2020-04-24 RX ORDER — GLYCERIN ADULT
1 SUPPOSITORY, RECTAL RECTAL AT BEDTIME
Refills: 0 | Status: DISCONTINUED | OUTPATIENT
Start: 2020-04-24 | End: 2020-04-26

## 2020-04-24 RX ORDER — KETOROLAC TROMETHAMINE 30 MG/ML
30 SYRINGE (ML) INJECTION EVERY 6 HOURS
Refills: 0 | Status: DISCONTINUED | OUTPATIENT
Start: 2020-04-24 | End: 2020-04-25

## 2020-04-24 RX ORDER — ACETAMINOPHEN 500 MG
1000 TABLET ORAL ONCE
Refills: 0 | Status: DISCONTINUED | OUTPATIENT
Start: 2020-04-24 | End: 2020-04-25

## 2020-04-24 RX ORDER — MAGNESIUM HYDROXIDE 400 MG/1
30 TABLET, CHEWABLE ORAL
Refills: 0 | Status: DISCONTINUED | OUTPATIENT
Start: 2020-04-24 | End: 2020-04-26

## 2020-04-24 RX ORDER — IBUPROFEN 200 MG
600 TABLET ORAL EVERY 6 HOURS
Refills: 0 | Status: COMPLETED | OUTPATIENT
Start: 2020-04-24 | End: 2021-03-23

## 2020-04-24 RX ADMIN — Medication 30 MILLIGRAM(S): at 22:01

## 2020-04-24 RX ADMIN — Medication 1000 MILLIUNIT(S)/MIN: at 07:22

## 2020-04-24 RX ADMIN — HYDROMORPHONE HYDROCHLORIDE 0.5 MILLIGRAM(S): 2 INJECTION INTRAMUSCULAR; INTRAVENOUS; SUBCUTANEOUS at 06:30

## 2020-04-24 RX ADMIN — HYDROMORPHONE HYDROCHLORIDE 30 MILLILITER(S): 2 INJECTION INTRAMUSCULAR; INTRAVENOUS; SUBCUTANEOUS at 02:39

## 2020-04-24 RX ADMIN — SODIUM CHLORIDE 1000 MILLILITER(S): 9 INJECTION, SOLUTION INTRAVENOUS at 06:33

## 2020-04-24 RX ADMIN — SODIUM CHLORIDE 1000 MILLILITER(S): 9 INJECTION, SOLUTION INTRAVENOUS at 02:26

## 2020-04-24 RX ADMIN — HYDROMORPHONE HYDROCHLORIDE 30 MILLILITER(S): 2 INJECTION INTRAMUSCULAR; INTRAVENOUS; SUBCUTANEOUS at 07:33

## 2020-04-24 RX ADMIN — Medication 30 MILLIGRAM(S): at 15:45

## 2020-04-24 RX ADMIN — SODIUM CHLORIDE 75 MILLILITER(S): 9 INJECTION, SOLUTION INTRAVENOUS at 22:01

## 2020-04-24 RX ADMIN — Medication 975 MILLIGRAM(S): at 20:14

## 2020-04-24 RX ADMIN — HYDROMORPHONE HYDROCHLORIDE 30 MILLILITER(S): 2 INJECTION INTRAMUSCULAR; INTRAVENOUS; SUBCUTANEOUS at 19:33

## 2020-04-24 RX ADMIN — HYDROMORPHONE HYDROCHLORIDE 0.5 MILLIGRAM(S): 2 INJECTION INTRAMUSCULAR; INTRAVENOUS; SUBCUTANEOUS at 15:13

## 2020-04-24 RX ADMIN — SODIUM CHLORIDE 75 MILLILITER(S): 9 INJECTION, SOLUTION INTRAVENOUS at 06:39

## 2020-04-24 RX ADMIN — HEPARIN SODIUM 5000 UNIT(S): 5000 INJECTION INTRAVENOUS; SUBCUTANEOUS at 15:37

## 2020-04-24 NOTE — OB RN DELIVERY SUMMARY - NS_SEPSISRSKCALC_OBGYN_ALL_OB_FT
GBS status in the 'Prenatal Lab tests/results section' on the OB RN Patient Profile must be documented. EOS calculated successfully. EOS Risk Factor: 0.5/1000 live births ([facility] national incidence); GA=38w1d; Temp=98.42; ROM=0; GBS='Negative'; Antibiotics='No antibiotics or any antibiotics < 2 hrs prior to birth'

## 2020-04-24 NOTE — DISCHARGE NOTE OB - PATIENT PORTAL LINK FT
You can access the FollowMyHealth Patient Portal offered by [facility] by registering at the following website: [email or web address]. By joining [facility]’s FollowMyHealth portal, you will also be able to view your health information using other applications (apps) compatible with our system.

## 2020-04-24 NOTE — DISCHARGE NOTE OB - MEDICATION SUMMARY - MEDICATIONS TO TAKE
I will START or STAY ON the medications listed below when I get home from the hospital:    ibuprofen 600 mg oral tablet  -- 1 tab(s) by mouth every 6 hours  -- Indication: For for pain I will START or STAY ON the medications listed below when I get home from the hospital:    acetaminophen 325 mg oral tablet  -- 3 tab(s) by mouth every 4 to 6 hours, As Needed  -- Indication: For S/P     ibuprofen 600 mg oral tablet  -- 1 tab(s) by mouth every 6 hours, As Needed  -- Indication: For S/P

## 2020-04-24 NOTE — OB NEONATOLOGY/PEDIATRICIAN DELIVERY SUMMARY - NSPEDSNEONOTESA_OBGYN_ALL_OB_FT
Baby boy born at 38 wks via CS to 41 yo , O+ blood type mother. Maternal history significant for back herniation and spinal stenosis, buprenorphine (weaned 2 weeks ago), polycystic kidney, Beta/Amp/Mag irais 3/4. SAB x 1, carpal tunnel.  Prenatal history significant for nuchal x 2 heavy meconium . PNL nr/immune/neg. Covid pending. GBS - on . Antibiotics were not given. ROM at delivery  heavy meconium fluid. Baby emerged vigorous and crying; was w/d/s/s with APGARs of 8/8 for color.  At 5 MOL started CPAP 5/21% for 02 saturation of 75%, slight retractions and continued for 15 minutes total max of 6/40%. Baby was weaned at 25 minutes of life to room air and by 30 minutes was saturdating 93%. NICU evaluated and deemed stable for transition and nursery. Mom would like to breast feed, wants Hep B, and circ. EOS N/A for no rupture.

## 2020-04-24 NOTE — DISCHARGE NOTE OB - CARE PLAN
Principal Discharge DX:	S/P   Goal:	return to normal health  Assessment and plan of treatment:	nothing in the vagina x 6 weeks  no heavy lifting > 10 pounds for 6 weeks

## 2020-04-24 NOTE — DISCHARGE NOTE OB - CARE PROVIDER_API CALL
Lizeth Acuna)  Obstetrics and Gynecology  [address] [place] [address]  Phone: [phone]  Fax: [phone]  Follow Up Time:

## 2020-04-24 NOTE — OB PROVIDER DELIVERY SUMMARY - NSPROVIDERDELIVERYNOTE_OBGYN_ALL_OB_FT
repeat LTCS, for labor, uncomplicated  viable male infant, vertex presentation, nuchal x2, Apgars 8/8, cord gasses sent  uterus closed in 2 layers with caprosyn  tex placed over hysterotomy site  Grossly normal fallopian tubes, uterus, and ovaries    EBL: 900  IVF: 800  UOP: 20    DORIS Quach PGY2  w/ Dr. Wood repeat LTCS, for labor, uncomplicated  viable male infant, vertex presentation, nuchal x2, Apgars 8/8, cord gasses sent  uterus closed in 2 layers with caprosyn  tex placed over hysterotomy site  Bladder filled and tested with Methylene blue noted intact   Grossly normal fallopian tubes, uterus, and ovaries    EBL: 900  IVF: 800  UOP: 20    CSushil Quach PGY2  w/ Dr. Wood

## 2020-04-24 NOTE — DISCHARGE NOTE OB - MEDICATION SUMMARY - MEDICATIONS TO STOP TAKING
I will STOP taking the medications listed below when I get home from the hospital:    buprenorphine 2 mg sublingual tablet  -- 0.5 tab(s) under tongue once a day I will STOP taking the medications listed below when I get home from the hospital:    buprenorphine 2 mg sublingual tablet  -- 0.5 tab(s) under tongue once a day    oxyCODONE 5 mg oral tablet  -- 1 tab(s) by mouth every 6 hours, As Needed  - for severe pain MDD:4 tabs  -- Caution federal law prohibits the transfer of this drug to any person other  than the person for whom it was prescribed.  It is very important that you take or use this exactly as directed.  Do not skip doses or discontinue unless directed by your doctor.  May cause drowsiness.  Alcohol may intensify this effect.  Use care when operating dangerous machinery.  This prescription cannot be refilled.  Using more of this medication than prescribed may cause serious breathing problems.

## 2020-04-24 NOTE — PROGRESS NOTE ADULT - SUBJECTIVE AND OBJECTIVE BOX
Attending note   called for urine output minimal to adequate and noted   very concentrated   - 1 Liter   input of fluid noted only 2l during and since surgery- Plan hydrate 1 L bolus of lactated ringers   C [place]

## 2020-04-25 RX ORDER — BENZOCAINE AND MENTHOL 5; 1 G/100ML; G/100ML
1 LIQUID ORAL EVERY 4 HOURS
Refills: 0 | Status: DISCONTINUED | OUTPATIENT
Start: 2020-04-25 | End: 2020-04-26

## 2020-04-25 RX ORDER — OXYCODONE HYDROCHLORIDE 5 MG/1
1 TABLET ORAL
Qty: 10 | Refills: 0
Start: 2020-04-25

## 2020-04-25 RX ORDER — OXYCODONE HYDROCHLORIDE 5 MG/1
5 TABLET ORAL ONCE
Refills: 0 | Status: DISCONTINUED | OUTPATIENT
Start: 2020-04-25 | End: 2020-04-25

## 2020-04-25 RX ORDER — OXYCODONE HYDROCHLORIDE 5 MG/1
5 TABLET ORAL
Refills: 0 | Status: COMPLETED | OUTPATIENT
Start: 2020-04-25 | End: 2020-05-02

## 2020-04-25 RX ORDER — OXYCODONE HYDROCHLORIDE 5 MG/1
5 TABLET ORAL ONCE
Refills: 0 | Status: DISCONTINUED | OUTPATIENT
Start: 2020-04-25 | End: 2020-04-26

## 2020-04-25 RX ORDER — OXYCODONE HYDROCHLORIDE 5 MG/1
5 TABLET ORAL
Refills: 0 | Status: DISCONTINUED | OUTPATIENT
Start: 2020-04-25 | End: 2020-04-26

## 2020-04-25 RX ADMIN — Medication 975 MILLIGRAM(S): at 21:00

## 2020-04-25 RX ADMIN — Medication 975 MILLIGRAM(S): at 16:15

## 2020-04-25 RX ADMIN — OXYCODONE HYDROCHLORIDE 5 MILLIGRAM(S): 5 TABLET ORAL at 10:20

## 2020-04-25 RX ADMIN — Medication 30 MILLIGRAM(S): at 05:00

## 2020-04-25 RX ADMIN — SIMETHICONE 80 MILLIGRAM(S): 80 TABLET, CHEWABLE ORAL at 19:56

## 2020-04-25 RX ADMIN — HEPARIN SODIUM 5000 UNIT(S): 5000 INJECTION INTRAVENOUS; SUBCUTANEOUS at 16:15

## 2020-04-25 RX ADMIN — Medication 30 MILLIGRAM(S): at 16:16

## 2020-04-25 RX ADMIN — Medication 30 MILLIGRAM(S): at 21:00

## 2020-04-25 RX ADMIN — SIMETHICONE 80 MILLIGRAM(S): 80 TABLET, CHEWABLE ORAL at 05:00

## 2020-04-25 RX ADMIN — Medication 975 MILLIGRAM(S): at 05:00

## 2020-04-25 RX ADMIN — OXYCODONE HYDROCHLORIDE 5 MILLIGRAM(S): 5 TABLET ORAL at 03:18

## 2020-04-25 RX ADMIN — Medication 30 MILLIGRAM(S): at 10:51

## 2020-04-25 RX ADMIN — Medication 975 MILLIGRAM(S): at 10:51

## 2020-04-25 RX ADMIN — OXYCODONE HYDROCHLORIDE 5 MILLIGRAM(S): 5 TABLET ORAL at 19:56

## 2020-04-25 RX ADMIN — SIMETHICONE 80 MILLIGRAM(S): 80 TABLET, CHEWABLE ORAL at 16:32

## 2020-04-25 RX ADMIN — SIMETHICONE 80 MILLIGRAM(S): 80 TABLET, CHEWABLE ORAL at 02:38

## 2020-04-25 RX ADMIN — HEPARIN SODIUM 5000 UNIT(S): 5000 INJECTION INTRAVENOUS; SUBCUTANEOUS at 03:18

## 2020-04-25 RX ADMIN — OXYCODONE HYDROCHLORIDE 5 MILLIGRAM(S): 5 TABLET ORAL at 16:15

## 2020-04-25 NOTE — PROGRESS NOTE ADULT - SUBJECTIVE AND OBJECTIVE BOX
Postop Day  __1_ s/p   C- Section    THERAPY:  [  ] Spinal morphine   [  ] Epidural morphine   [x  ] IV PCA Hydromorphone 1 mg/ml      Sedation Score:	  [x  ] Alert	    [  ] Drowsy        [  ] Arousable	[  ] Asleep	[  ] Unresponsive    Side Effects:	  [ x ] None	     [  ] Nausea        [  ] Pruritus        [  ] Weakness   [  ] Numbness        ASSESSMENT/ PLAN   [ x  ] Discontinue         [  ] Continue  [ x ]Documentation and Verification of current medications     Comments:

## 2020-04-25 NOTE — PROGRESS NOTE ADULT - SUBJECTIVE AND OBJECTIVE BOX
Postpartum Note,  Section  She is a  40y woman who is now post-operative day: 1.  C/o pain, but she just woke up.  Was out of bed yesterday + void + flatus + tolerating regular diet     Subjective:  The patient feels well.  She is ambulating.   She is tolerating regular diet.  She denies nausea and vomiting.  She is voiding.  Her pain is controlled.  She reports normal postpartum bleeding.  She is breastfeeding.  She is formula feeding.    Physical exam:    Vital Signs Last 24 Hrs  T(C): 36.6 (2020 05:40), Max: 37.1 (2020 01:00)  T(F): 97.8 (2020 05:40), Max: 98.8 (2020 01:00)  HR: 79 (2020 05:40) (67 - 86)  BP: 100/63 (2020 05:40) (99/58 - 119/71)  BP(mean): 66 (2020 00:00) (61 - 86)  RR: 17 (2020 05:40) (14 - 25)  SpO2: 100% (2020 05:40) (97% - 100%)    Gen: NAD  Breast: Soft, nontender, not engorged.  Abdomen: Soft, nontender, no distension , firm uterine fundus at umbilicus.  Incision: Clean, dry, and intact   Pelvic: Normal lochia noted  Ext: No calf tenderness    LABS:                        9.8    18.30 )-----------( 181      ( 2020 08:25 )             28.6       Rubella status:     Allergies    No Known Allergies    Intolerances      MEDICATIONS  (STANDING):  acetaminophen   Tablet .. 975 milliGRAM(s) Oral <User Schedule>  diphtheria/tetanus/pertussis (acellular) Vaccine (ADAcel) 0.5 milliLiter(s) IntraMuscular once  heparin   Injectable 5000 Unit(s) SubCutaneous every 12 hours  ibuprofen  Tablet. 600 milliGRAM(s) Oral every 6 hours  ketorolac   Injectable 30 milliGRAM(s) IV Push every 6 hours    MEDICATIONS  (PRN):  benzocaine 15 mG/menthol 3.6 mG (Sugar-Free) Lozenge 1 Lozenge Oral every 4 hours PRN Sore Throat  diphenhydrAMINE 25 milliGRAM(s) Oral every 6 hours PRN Itching  glycerin Suppository - Adult 1 Suppository(s) Rectal at bedtime PRN Constipation  lanolin Ointment 1 Application(s) Topical every 6 hours PRN Sore Nipples  magnesium hydroxide Suspension 30 milliLiter(s) Oral two times a day PRN Constipation  oxyCODONE    IR 5 milliGRAM(s) Oral every 3 hours PRN Moderate to Severe Pain (4-10)  oxyCODONE    IR 5 milliGRAM(s) Oral once PRN Moderate to Severe Pain (4-10)  simethicone 80 milliGRAM(s) Chew every 4 hours PRN Gas        Assessment and Plan  POD #1 s/p  section  Doing well with pain management  Encourage ambulation.  discharge planning   YVON Elmore

## 2020-04-26 VITALS
TEMPERATURE: 98 F | OXYGEN SATURATION: 100 % | SYSTOLIC BLOOD PRESSURE: 105 MMHG | HEART RATE: 74 BPM | DIASTOLIC BLOOD PRESSURE: 64 MMHG | RESPIRATION RATE: 18 BRPM

## 2020-04-26 DIAGNOSIS — F11.21 OPIOID DEPENDENCE, IN REMISSION: ICD-10-CM

## 2020-04-26 DIAGNOSIS — M54.5 LOW BACK PAIN: ICD-10-CM

## 2020-04-26 DIAGNOSIS — Z87.891 PERSONAL HISTORY OF NICOTINE DEPENDENCE: ICD-10-CM

## 2020-04-26 DIAGNOSIS — Z98.891 HISTORY OF UTERINE SCAR FROM PREVIOUS SURGERY: ICD-10-CM

## 2020-04-26 RX ORDER — IBUPROFEN 200 MG
600 TABLET ORAL EVERY 6 HOURS
Refills: 0 | Status: DISCONTINUED | OUTPATIENT
Start: 2020-04-26 | End: 2020-04-26

## 2020-04-26 RX ORDER — KETOROLAC TROMETHAMINE 30 MG/ML
30 SYRINGE (ML) INJECTION ONCE
Refills: 0 | Status: DISCONTINUED | OUTPATIENT
Start: 2020-04-26 | End: 2020-04-26

## 2020-04-26 RX ORDER — ACETAMINOPHEN 500 MG
3 TABLET ORAL
Qty: 0 | Refills: 0 | DISCHARGE
Start: 2020-04-26

## 2020-04-26 RX ADMIN — SIMETHICONE 80 MILLIGRAM(S): 80 TABLET, CHEWABLE ORAL at 01:02

## 2020-04-26 RX ADMIN — HEPARIN SODIUM 5000 UNIT(S): 5000 INJECTION INTRAVENOUS; SUBCUTANEOUS at 03:25

## 2020-04-26 RX ADMIN — Medication 975 MILLIGRAM(S): at 09:16

## 2020-04-26 RX ADMIN — SIMETHICONE 80 MILLIGRAM(S): 80 TABLET, CHEWABLE ORAL at 03:26

## 2020-04-26 RX ADMIN — Medication 600 MILLIGRAM(S): at 09:16

## 2020-04-26 RX ADMIN — OXYCODONE HYDROCHLORIDE 5 MILLIGRAM(S): 5 TABLET ORAL at 00:45

## 2020-04-26 RX ADMIN — OXYCODONE HYDROCHLORIDE 5 MILLIGRAM(S): 5 TABLET ORAL at 03:26

## 2020-04-26 RX ADMIN — SIMETHICONE 80 MILLIGRAM(S): 80 TABLET, CHEWABLE ORAL at 11:00

## 2020-04-26 RX ADMIN — OXYCODONE HYDROCHLORIDE 5 MILLIGRAM(S): 5 TABLET ORAL at 09:16

## 2020-04-26 RX ADMIN — Medication 30 MILLIGRAM(S): at 03:25

## 2020-04-26 RX ADMIN — Medication 975 MILLIGRAM(S): at 03:24

## 2020-04-26 NOTE — PROGRESS NOTE ADULT - SUBJECTIVE AND OBJECTIVE BOX
NP Provider Contact note:  Patient seen at bedside resting comfortably offers no new complaints. + Ambulation, + void without difficulty, + flatus;  no bm;  tolerating regular diet. Both breastfeeding and bottle feeding. Bonding well w/ . Denies HA, blurry vision or epigastric pain, CP, SOB, N/V/D,  dizziness, palpitations, worsening vaginal bleeding. Know hx of chronic pain with substance dependence, s/p taper in pregnancy w bupneorphine. Baby in NICU for monitoring, Pt reports actively in contact w/ chronic pain doctor. PT denies anxiety or depression or currently feeling withdrawl symptoms.     Vital Signs Last 24 Hrs  T(C): 36.9 (2020 01:49), Max: 36.9 (2020 18:15)  T(F): 98.5 (2020 01:49), Max: 98.5 (2020 18:15)  HR: 74 (2020 01:49) (74 - 79)  BP: 105/64 (2020 01:49) (100/60 - 105/64)  BP(mean): --  RR: 18 (2020 01:49) (16 - 18)  SpO2: 100% (2020 01:49) (98% - 100%)    A/P: POD #3 s/p c/s   -d/c home per Dr Elmore OK to discharge  -instructions verbalized. Strongly encouraged follow up w/ Chronic pain Dr  - encouraged Motrin And Tylenol only  -follow up in 1-2weeks in office for incision check  -d/w dr Catarina Cali, [facility]

## 2020-04-26 NOTE — PROGRESS NOTE ADULT - ASSESSMENT
Gen: A&O x 3, NAD  Chest: CTA B/L  Cardiac: S1,S2  RRR  Breast: Soft, nontender, nonengorged  Abdomen: +BS; soft; Nontender, nondistended, Incision C/D/I . Du  Gyn: Minimal lochia  Extremities: Nontender, DTRS 2+, no worsening edema

## 2020-04-28 ENCOUNTER — APPOINTMENT (OUTPATIENT)
Dept: ANTEPARTUM | Facility: CLINIC | Age: 40
End: 2020-04-28

## 2021-01-22 NOTE — OB RN PATIENT PROFILE - NSSTATEDREASONFORADM_OBGYN_A_OB_FT
L[facility] FOR [facility] OK TO SCHEDULE.   
I had vaginal bleeding today

## 2022-03-29 NOTE — OB PROVIDER TRIAGE NOTE - NSPREVIOUSLIVEBIRTHSNOWDEAD_OBGYN_ALL_OB_NU
Med:  Zonisamide 100 mg  -  Last filled on 1/19/2022 for 180 with 3 refills    LOV:  1/19/2022    FUV:  None scheduled    Patient has refills on file at the pharmacy    Message sent to the patient to call the pharmacy for a refill  
0

## 2022-11-22 ENCOUNTER — APPOINTMENT (OUTPATIENT)
Dept: INTERNAL MEDICINE | Facility: CLINIC | Age: 42
End: 2022-11-22

## 2022-11-22 VITALS
OXYGEN SATURATION: 98 % | BODY MASS INDEX: 19.88 KG/M2 | HEIGHT: 62 IN | SYSTOLIC BLOOD PRESSURE: 120 MMHG | WEIGHT: 108 LBS | HEART RATE: 67 BPM | TEMPERATURE: 97.5 F | DIASTOLIC BLOOD PRESSURE: 80 MMHG

## 2022-11-22 DIAGNOSIS — Z80.0 FAMILY HISTORY OF MALIGNANT NEOPLASM OF DIGESTIVE ORGANS: ICD-10-CM

## 2022-11-22 DIAGNOSIS — Z82.3 FAMILY HISTORY OF STROKE: ICD-10-CM
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
## 2022-11-22 DIAGNOSIS — M54.50 LOW BACK PAIN, UNSPECIFIED: ICD-10-CM

## 2022-11-22 DIAGNOSIS — M50.20 OTHER CERVICAL DISC DISPLACEMENT, UNSPECIFIED CERVICAL REGION: ICD-10-CM

## 2022-11-22 DIAGNOSIS — F17.210 NICOTINE DEPENDENCE, CIGARETTES, UNCOMPLICATED: ICD-10-CM

## 2022-11-22 DIAGNOSIS — Z82.49 FAMILY HISTORY OF ISCHEMIC HEART DISEASE AND OTHER DISEASES OF THE CIRCULATORY SYSTEM: ICD-10-CM

## 2022-11-22 DIAGNOSIS — M51.26 OTHER INTERVERTEBRAL DISC DISPLACEMENT, LUMBAR REGION: ICD-10-CM

## 2022-11-22 DIAGNOSIS — G89.29 LOW BACK PAIN, UNSPECIFIED: ICD-10-CM

## 2022-11-22 DIAGNOSIS — R53.83 OTHER FATIGUE: ICD-10-CM

## 2022-11-22 DIAGNOSIS — M79.10 MYALGIA, UNSPECIFIED SITE: ICD-10-CM

## 2022-11-22 PROCEDURE — 99204 OFFICE O/P NEW MOD 45 MIN: CPT | Mod: 25

## 2022-11-22 RX ORDER — DOXYCYCLINE 100 MG/1
100 TABLET, FILM COATED ORAL TWICE DAILY
Qty: 42 | Refills: 0 | Status: ACTIVE | COMMUNITY
Start: 2022-11-22 | End: 1900-01-01

## 2022-11-22 RX ORDER — TRAMADOL HYDROCHLORIDE 300 MG/1
300 TABLET, EXTENDED RELEASE ORAL
Qty: 30 | Refills: 0 | Status: ACTIVE | COMMUNITY
Start: 2022-05-18

## 2022-11-22 RX ORDER — LIDOCAINE 50 MG/G
5 PATCH CUTANEOUS
Refills: 0 | Status: ACTIVE | COMMUNITY

## 2022-11-22 RX ORDER — OXYCODONE HYDROCHLORIDE 15 MG/1
15 TABLET ORAL
Qty: 120 | Refills: 0 | Status: ACTIVE | COMMUNITY
Start: 2022-11-02

## 2022-11-22 NOTE — HISTORY OF PRESENT ILLNESS
[FreeTextEntry1] : 42-year-old female here for evaluation of tick bite.  Patient reports she was bitten by a tick and found on her foot in April 2022.  Patient removed the tick, the tick was dead when she found it.  Vaguely describes a rash around the tick bite.  Is not sure if it was around bull's-eye-like rash.  Approximately a month later she started developing fatigue, muscle aches.  Denies any other rashes on her body.  No fever no chills.  She saw her primary care physician and had a work-up for fatigue.  According to the patient work-up was unrevealing.  Patient decided to seek infectious disease consultation.

## 2022-11-22 NOTE — ASSESSMENT
[FreeTextEntry1] : 42-year-old female here for evaluation of tick bite\par \par Tick bite\par Questionable erythema migrans rash\par Fatigue\par Myalgias\par \par Recommendation:\par We will check for Babesia, Borrelia burgdorferi, Ehrlichia, Anaplasma, [place] spotted fever\par We will also check a CBC, CMP, CRP, CPK, ESR, TSH\par Given remote history of erythema migrans rash we will start patient on doxycycline 100 mg every 12 hours\par \par Follow-up after work-up\par \par Counseling included lab results, differential diagnosis, treatment options, risks and benefits, lifestyle changes, current condition, medications and dose adjustments.\par The patient was interactive attentive and asked questions and verbalized understanding.

## 2022-11-22 NOTE — REVIEW OF SYSTEMS
[As Noted in HPI] : as noted in HPI [Body Aches] : body aches [Feeling Tired] : feeling tired [Normal Appetite] : normal appetite [Negative] : Heme/Lymph [Fever] : no fever [Chills] : no chills [Difficulty Sleeping] : no difficulty sleeping

## 2022-11-22 NOTE — PHYSICAL EXAM
[General Appearance - Alert] : alert [General Appearance - In No Acute Distress] : in no acute distress [General Appearance - Well-Appearing] : healthy appearing [Sclera] : the sclera and conjunctiva were normal [Outer Ear] : the ears and nose were normal in appearance [Neck Appearance] : the appearance of the neck was normal [Exaggerated Use Of Accessory Muscles For Inspiration] : no accessory muscle use [Musculoskeletal - Swelling] : no joint swelling [] : no rash [Motor Exam] : the motor exam was normal [No Focal Deficits] : no focal deficits [Oriented To Time, Place, And Person] : oriented to person, place, and time [Affect] : the affect was normal

## 2022-11-30 LAB
CK SERPL-CCNC: 131 U/L
CRP SERPL-MCNC: <3 MG/L
ERYTHROCYTE [SEDIMENTATION RATE] IN BLOOD BY WESTERGREN METHOD: 37 MM/HR
TSH SERPL-ACNC: 0.81 UIU/ML

## 2022-12-08 ENCOUNTER — APPOINTMENT (OUTPATIENT)
Dept: INTERNAL MEDICINE | Facility: CLINIC | Age: 42
End: 2022-12-08

## 2023-01-23 NOTE — OB PROVIDER H&P - PRO INTERPRETER NEED 2
Rx Refill Note  Requested Prescriptions     Pending Prescriptions Disp Refills   • meloxicam (MOBIC) 15 MG tablet [Pharmacy Med Name: MELOXICAM 15 MG TABLET] 30 tablet 0     Sig: TAKE ONE TABLET BY MOUTH DAILY WITH FOOD      Last office visit with prescribing clinician: 10/18/2022   Last telemedicine visit with prescribing clinician: 1/23/2023   Next office visit with prescribing clinician: 1/23/2023                         Would you like a call back once the refill request has been completed: [] Yes [] No    If the office needs to give you a call back, can they leave a voicemail: [] Yes [] No    Maddie Chamberlain MA  01/23/23, 11:14 EST  
English

## 2023-03-15 NOTE — OB PROVIDER TRIAGE NOTE - PSH
morning meds as per MD Carpal tunnel syndrome  both wrist, surgery R  S/P   13 M 8#3, arrest of descent

## 2023-05-18 NOTE — OB RN PREOPERATIVE CHECKLIST - NS_PREOPMEDADMIN_OBGYN_ALL_OB
Furosemide 20 mg daily PRN for weight gain > 3 pounds in 24 hours or 5 pounds in 1 week.     If requires > 3 PRN doses in 1 week, please notify CHF clinic.    Yes, see eMAR

## 2023-10-30 NOTE — PROVIDER CONTACT NOTE (CHANGE IN STATUS NOTIFICATION) - SITUATION
Message to provider:  Patient is requesting a leave of absence  Information needed  Emailed to nurses email and put in nurses mailbox    [] Writer advised caller of callback from clinic within 24-72 hours pt 5 hours post-op, decreased urine output intermittently

## 2024-03-08 NOTE — OB PROVIDER H&P - BIRTH SEX
normal appearance , without tenderness upon palpation , no deformities , trachea midline , Thyroid normal size , no masses , thyroid nontender Female

## 2024-04-08 NOTE — DISCHARGE NOTE ANTEPARTUM - NS AS DC STROKE DX YN
Problem: ABCDS Injury Assessment  Goal: Absence of physical injury  Outcome: Progressing     Problem: Skin/Tissue Integrity  Goal: Absence of new skin breakdown  Description: 1.  Monitor for areas of redness and/or skin breakdown  2.  Assess vascular access sites hourly  3.  Every 4-6 hours minimum:  Change oxygen saturation probe site  4.  Every 4-6 hours:  If on nasal continuous positive airway pressure, respiratory therapy assess nares and determine need for appliance change or resting period.  Outcome: Progressing      no